# Patient Record
Sex: FEMALE | Race: WHITE | NOT HISPANIC OR LATINO
[De-identification: names, ages, dates, MRNs, and addresses within clinical notes are randomized per-mention and may not be internally consistent; named-entity substitution may affect disease eponyms.]

---

## 2017-04-10 ENCOUNTER — MEDICATION RENEWAL (OUTPATIENT)
Age: 67
End: 2017-04-10

## 2017-04-19 ENCOUNTER — MEDICATION RENEWAL (OUTPATIENT)
Age: 67
End: 2017-04-19

## 2017-04-27 ENCOUNTER — RX RENEWAL (OUTPATIENT)
Age: 67
End: 2017-04-27

## 2017-05-03 ENCOUNTER — NON-APPOINTMENT (OUTPATIENT)
Age: 67
End: 2017-05-03

## 2017-05-03 ENCOUNTER — APPOINTMENT (OUTPATIENT)
Dept: INTERNAL MEDICINE | Facility: CLINIC | Age: 67
End: 2017-05-03

## 2017-05-03 VITALS
TEMPERATURE: 98.4 F | OXYGEN SATURATION: 97 % | BODY MASS INDEX: 28.32 KG/M2 | HEIGHT: 65 IN | SYSTOLIC BLOOD PRESSURE: 126 MMHG | HEART RATE: 85 BPM | DIASTOLIC BLOOD PRESSURE: 72 MMHG | RESPIRATION RATE: 16 BRPM | WEIGHT: 170 LBS

## 2017-05-17 ENCOUNTER — APPOINTMENT (OUTPATIENT)
Dept: INTERNAL MEDICINE | Facility: CLINIC | Age: 67
End: 2017-05-17

## 2017-05-17 ENCOUNTER — OTHER (OUTPATIENT)
Age: 67
End: 2017-05-17

## 2017-05-17 VITALS
HEART RATE: 74 BPM | BODY MASS INDEX: 28.82 KG/M2 | TEMPERATURE: 98.7 F | OXYGEN SATURATION: 95 % | HEIGHT: 65 IN | WEIGHT: 173 LBS | DIASTOLIC BLOOD PRESSURE: 70 MMHG | SYSTOLIC BLOOD PRESSURE: 124 MMHG | RESPIRATION RATE: 16 BRPM

## 2017-05-19 ENCOUNTER — MEDICATION RENEWAL (OUTPATIENT)
Age: 67
End: 2017-05-19

## 2017-06-12 ENCOUNTER — RECORD ABSTRACTING (OUTPATIENT)
Age: 67
End: 2017-06-12

## 2017-06-12 DIAGNOSIS — D21.6 BENIGN NEOPLASM OF CONNECTIVE AND OTHER SOFT TISSUE OF TRUNK, UNSPECIFIED: ICD-10-CM

## 2017-06-12 DIAGNOSIS — R94.31 ABNORMAL ELECTROCARDIOGRAM [ECG] [EKG]: ICD-10-CM

## 2017-06-12 DIAGNOSIS — Z79.899 OTHER LONG TERM (CURRENT) DRUG THERAPY: ICD-10-CM

## 2017-06-13 ENCOUNTER — APPOINTMENT (OUTPATIENT)
Dept: INTERNAL MEDICINE | Facility: CLINIC | Age: 67
End: 2017-06-13

## 2017-06-13 ENCOUNTER — MED ADMIN CHARGE (OUTPATIENT)
Age: 67
End: 2017-06-13

## 2017-06-13 ENCOUNTER — NON-APPOINTMENT (OUTPATIENT)
Age: 67
End: 2017-06-13

## 2017-06-13 VITALS
HEIGHT: 65 IN | DIASTOLIC BLOOD PRESSURE: 72 MMHG | HEART RATE: 80 BPM | RESPIRATION RATE: 18 BRPM | WEIGHT: 175 LBS | OXYGEN SATURATION: 97 % | BODY MASS INDEX: 29.16 KG/M2 | SYSTOLIC BLOOD PRESSURE: 116 MMHG | TEMPERATURE: 98.5 F

## 2017-06-13 DIAGNOSIS — Z82.49 FAMILY HISTORY OF ISCHEMIC HEART DISEASE AND OTHER DISEASES OF THE CIRCULATORY SYSTEM: ICD-10-CM

## 2017-06-13 DIAGNOSIS — J98.01 ACUTE BRONCHOSPASM: ICD-10-CM

## 2017-06-13 DIAGNOSIS — Z80.2 FAMILY HISTORY OF MALIGNANT NEOPLASM OF OTHER RESPIRATORY AND INTRATHORACIC ORGANS: ICD-10-CM

## 2017-06-13 DIAGNOSIS — Z81.8 FAMILY HISTORY OF OTHER MENTAL AND BEHAVIORAL DISORDERS: ICD-10-CM

## 2017-06-13 DIAGNOSIS — Z80.3 FAMILY HISTORY OF MALIGNANT NEOPLASM OF BREAST: ICD-10-CM

## 2017-06-13 DIAGNOSIS — Z91.19 PATIENT'S NONCOMPLIANCE WITH OTHER MEDICAL TREATMENT AND REGIMEN: ICD-10-CM

## 2017-06-13 DIAGNOSIS — Z78.9 OTHER SPECIFIED HEALTH STATUS: ICD-10-CM

## 2017-06-13 LAB
BASOPHILS # BLD AUTO: 0.02 K/UL
BASOPHILS NFR BLD AUTO: 0.3 %
EOSINOPHIL # BLD AUTO: 0.2 K/UL
EOSINOPHIL NFR BLD AUTO: 3.3 %
HCT VFR BLD CALC: 41.6 %
HGB BLD-MCNC: 13.7 G/DL
IMM GRANULOCYTES NFR BLD AUTO: 0.2 %
LYMPHOCYTES # BLD AUTO: 2.37 K/UL
LYMPHOCYTES NFR BLD AUTO: 38.7 %
MAN DIFF?: NORMAL
MCHC RBC-ENTMCNC: 30.4 PG
MCHC RBC-ENTMCNC: 32.9 GM/DL
MCV RBC AUTO: 92.2 FL
MONOCYTES # BLD AUTO: 0.45 K/UL
MONOCYTES NFR BLD AUTO: 7.4 %
NEUTROPHILS # BLD AUTO: 3.07 K/UL
NEUTROPHILS NFR BLD AUTO: 50.1 %
PLATELET # BLD AUTO: 244 K/UL
RBC # BLD: 4.51 M/UL
RBC # FLD: 13.4 %
WBC # FLD AUTO: 6.12 K/UL

## 2017-06-13 RX ORDER — TRIAMCINOLONE ACETONIDE 40 MG/ML
40 INJECTION, SUSPENSION INTRA-ARTICULAR; INTRAMUSCULAR
Qty: 1 | Refills: 0 | Status: COMPLETED | OUTPATIENT
Start: 2017-06-13

## 2017-06-13 RX ADMIN — TRIAMCINOLONE ACETONIDE 1.5 MG/ML: 40 INJECTION, SUSPENSION INTRA-ARTICULAR; INTRAMUSCULAR at 00:00

## 2017-11-03 ENCOUNTER — OTHER (OUTPATIENT)
Age: 67
End: 2017-11-03

## 2017-11-06 ENCOUNTER — APPOINTMENT (OUTPATIENT)
Dept: INTERNAL MEDICINE | Facility: CLINIC | Age: 67
End: 2017-11-06
Payer: MEDICARE

## 2017-11-06 ENCOUNTER — APPOINTMENT (OUTPATIENT)
Dept: INTERNAL MEDICINE | Facility: CLINIC | Age: 67
End: 2017-11-06
Payer: COMMERCIAL

## 2017-11-06 VITALS
WEIGHT: 175 LBS | TEMPERATURE: 98.4 F | HEIGHT: 65 IN | RESPIRATION RATE: 20 BRPM | HEART RATE: 80 BPM | DIASTOLIC BLOOD PRESSURE: 68 MMHG | BODY MASS INDEX: 29.16 KG/M2 | OXYGEN SATURATION: 97 % | SYSTOLIC BLOOD PRESSURE: 144 MMHG

## 2017-11-06 PROCEDURE — 90670 PCV13 VACCINE IM: CPT

## 2017-11-06 PROCEDURE — 94060 EVALUATION OF WHEEZING: CPT

## 2017-11-06 PROCEDURE — ZZZZZ: CPT

## 2017-11-06 PROCEDURE — 94729 DIFFUSING CAPACITY: CPT

## 2017-11-06 PROCEDURE — G0009: CPT

## 2017-11-06 PROCEDURE — 94727 GAS DIL/WSHOT DETER LNG VOL: CPT

## 2017-11-06 PROCEDURE — 99214 OFFICE O/P EST MOD 30 MIN: CPT | Mod: 25

## 2017-12-05 LAB
CHOLEST SERPL-MCNC: 204
GLUCOSE SERPL-MCNC: 102
HBA1C MFR BLD HPLC: 5.4
HDLC SERPL-MCNC: 43
LDLC SERPL CALC-MCNC: 122

## 2018-04-09 ENCOUNTER — RX RENEWAL (OUTPATIENT)
Age: 68
End: 2018-04-09

## 2018-04-20 ENCOUNTER — RX RENEWAL (OUTPATIENT)
Age: 68
End: 2018-04-20

## 2018-05-08 ENCOUNTER — APPOINTMENT (OUTPATIENT)
Dept: INTERNAL MEDICINE | Facility: CLINIC | Age: 68
End: 2018-05-08
Payer: MEDICARE

## 2018-05-08 ENCOUNTER — NON-APPOINTMENT (OUTPATIENT)
Age: 68
End: 2018-05-08

## 2018-05-08 VITALS
HEIGHT: 65 IN | TEMPERATURE: 97.6 F | DIASTOLIC BLOOD PRESSURE: 88 MMHG | SYSTOLIC BLOOD PRESSURE: 142 MMHG | WEIGHT: 185 LBS | BODY MASS INDEX: 30.82 KG/M2 | RESPIRATION RATE: 18 BRPM | OXYGEN SATURATION: 96 % | HEART RATE: 88 BPM

## 2018-05-08 DIAGNOSIS — E83.52 HYPERCALCEMIA: ICD-10-CM

## 2018-05-08 PROCEDURE — 99214 OFFICE O/P EST MOD 30 MIN: CPT | Mod: 25

## 2018-05-08 PROCEDURE — 94010 BREATHING CAPACITY TEST: CPT

## 2018-06-07 ENCOUNTER — APPOINTMENT (OUTPATIENT)
Dept: INTERNAL MEDICINE | Facility: CLINIC | Age: 68
End: 2018-06-07
Payer: MEDICARE

## 2018-06-07 VITALS
HEART RATE: 80 BPM | RESPIRATION RATE: 16 BRPM | SYSTOLIC BLOOD PRESSURE: 126 MMHG | BODY MASS INDEX: 30.82 KG/M2 | DIASTOLIC BLOOD PRESSURE: 80 MMHG | HEIGHT: 65 IN | OXYGEN SATURATION: 96 % | WEIGHT: 185 LBS | TEMPERATURE: 98.7 F

## 2018-06-07 DIAGNOSIS — Z87.09 PERSONAL HISTORY OF OTHER DISEASES OF THE RESPIRATORY SYSTEM: ICD-10-CM

## 2018-06-07 PROCEDURE — 99213 OFFICE O/P EST LOW 20 MIN: CPT

## 2018-06-07 NOTE — PLAN
[FreeTextEntry1] : Warm salt gargles.\par Increase fluids.\par Cefuroxime 500 mg tabs 1 tab every 12 hrs for 10 days.\par Medrol dose pack for throat pain relief.\par Instructed to call office for worsening symptoms, no resolve.

## 2018-06-07 NOTE — REVIEW OF SYSTEMS
[Fever] : no fever [Chills] : no chills [Fatigue] : fatigue [Hoarseness] : hoarseness [Nasal Discharge] : no nasal discharge [Sore Throat] : sore throat [Postnasal Drip] : no postnasal drip [Negative] : Psychiatric

## 2018-06-07 NOTE — HISTORY OF PRESENT ILLNESS
[FreeTextEntry8] : Patient presents for evaluation of sore throat.  \par Onset 48 hours ago, so painful that she had difficulty sleeping last night.  \par Denies other respiratory ailments at this time.  \par No other contacts are ill.  \par She has not tried anything for relief.\par Asthma has been under control.  Compliant with pulmonary medications.\par Mentions that she has been using Albuterol before her walks in the park because of allergy season and this has been working well for her.

## 2018-06-07 NOTE — PHYSICAL EXAM
[No Acute Distress] : no acute distress [Well Nourished] : well nourished [Well Developed] : well developed [Normal Sclera/Conjunctiva] : normal sclera/conjunctiva [PERRL] : pupils equal round and reactive to light [Normal TMs] : both tympanic membranes were normal [Supple] : supple [No Respiratory Distress] : no respiratory distress  [Clear to Auscultation] : lungs were clear to auscultation bilaterally [Normal Rate] : normal rate  [Regular Rhythm] : with a regular rhythm [Normal S1, S2] : normal S1 and S2 [No Edema] : there was no peripheral edema [Normal Posterior Cervical Nodes] : no posterior cervical lymphadenopathy [Normal Anterior Cervical Nodes] : no anterior cervical lymphadenopathy [Grossly Normal Strength/Tone] : grossly normal strength/tone [No Rash] : no rash [No Focal Deficits] : no focal deficits [Normal Affect] : the affect was normal [Normal Insight/Judgement] : insight and judgment were intact [de-identified] : Oropharynx moderately erythematous without exudates.

## 2018-07-18 PROBLEM — E83.52 SERUM CALCIUM ELEVATED: Status: ACTIVE | Noted: 2018-07-18

## 2018-07-19 ENCOUNTER — RX CHANGE (OUTPATIENT)
Age: 68
End: 2018-07-19

## 2018-07-19 RX ORDER — ROSUVASTATIN CALCIUM 10 MG/1
10 TABLET, FILM COATED ORAL DAILY
Qty: 30 | Refills: 0 | Status: DISCONTINUED | COMMUNITY
Start: 2017-04-27 | End: 2018-07-19

## 2018-07-20 ENCOUNTER — MEDICATION RENEWAL (OUTPATIENT)
Age: 68
End: 2018-07-20

## 2018-09-04 ENCOUNTER — RX RENEWAL (OUTPATIENT)
Age: 68
End: 2018-09-04

## 2018-09-24 ENCOUNTER — OUTPATIENT (OUTPATIENT)
Dept: OUTPATIENT SERVICES | Facility: HOSPITAL | Age: 68
LOS: 1 days | Discharge: HOME | End: 2018-09-24
Payer: MEDICARE

## 2018-09-24 VITALS
HEIGHT: 65 IN | RESPIRATION RATE: 16 BRPM | SYSTOLIC BLOOD PRESSURE: 152 MMHG | OXYGEN SATURATION: 98 % | WEIGHT: 168.43 LBS | HEART RATE: 84 BPM | DIASTOLIC BLOOD PRESSURE: 84 MMHG | TEMPERATURE: 99 F

## 2018-09-24 DIAGNOSIS — N39.3 STRESS INCONTINENCE (FEMALE) (MALE): ICD-10-CM

## 2018-09-24 DIAGNOSIS — Z98.890 OTHER SPECIFIED POSTPROCEDURAL STATES: Chronic | ICD-10-CM

## 2018-09-24 DIAGNOSIS — Z87.19 PERSONAL HISTORY OF OTHER DISEASES OF THE DIGESTIVE SYSTEM: Chronic | ICD-10-CM

## 2018-09-24 LAB
ALLERGY+IMMUNOLOGY DIAG STUDY NOTE: SIGNIFICANT CHANGE UP
ANION GAP SERPL CALC-SCNC: 3 MMOL/L — LOW (ref 5–17)
APTT BLD: 40.4 SEC — HIGH (ref 27.5–37.4)
BASOPHILS # BLD AUTO: 0.04 K/UL — SIGNIFICANT CHANGE UP (ref 0–0.2)
BASOPHILS NFR BLD AUTO: 0.7 % — SIGNIFICANT CHANGE UP (ref 0–2)
BUN SERPL-MCNC: 23 MG/DL — SIGNIFICANT CHANGE UP (ref 7–23)
CALCIUM SERPL-MCNC: 9.3 MG/DL — SIGNIFICANT CHANGE UP (ref 8.5–10.1)
CHLORIDE SERPL-SCNC: 106 MMOL/L — SIGNIFICANT CHANGE UP (ref 96–108)
CO2 SERPL-SCNC: 28 MMOL/L — SIGNIFICANT CHANGE UP (ref 22–31)
CREAT SERPL-MCNC: 1.15 MG/DL — SIGNIFICANT CHANGE UP (ref 0.5–1.3)
EOSINOPHIL # BLD AUTO: 0.18 K/UL — SIGNIFICANT CHANGE UP (ref 0–0.5)
EOSINOPHIL NFR BLD AUTO: 3 % — SIGNIFICANT CHANGE UP (ref 0–6)
GLUCOSE SERPL-MCNC: 96 MG/DL — SIGNIFICANT CHANGE UP (ref 70–99)
HCT VFR BLD CALC: 45.2 % — HIGH (ref 34.5–45)
HGB BLD-MCNC: 15 G/DL — SIGNIFICANT CHANGE UP (ref 11.5–15.5)
IMM GRANULOCYTES NFR BLD AUTO: 0.3 % — SIGNIFICANT CHANGE UP (ref 0–1.5)
INR BLD: 1 RATIO — SIGNIFICANT CHANGE UP (ref 0.88–1.16)
LYMPHOCYTES # BLD AUTO: 1.83 K/UL — SIGNIFICANT CHANGE UP (ref 1–3.3)
LYMPHOCYTES # BLD AUTO: 30.3 % — SIGNIFICANT CHANGE UP (ref 13–44)
MCHC RBC-ENTMCNC: 31 PG — SIGNIFICANT CHANGE UP (ref 27–34)
MCHC RBC-ENTMCNC: 33.2 GM/DL — SIGNIFICANT CHANGE UP (ref 32–36)
MCV RBC AUTO: 93.4 FL — SIGNIFICANT CHANGE UP (ref 80–100)
MONOCYTES # BLD AUTO: 0.48 K/UL — SIGNIFICANT CHANGE UP (ref 0–0.9)
MONOCYTES NFR BLD AUTO: 8 % — SIGNIFICANT CHANGE UP (ref 2–14)
NEUTROPHILS # BLD AUTO: 3.48 K/UL — SIGNIFICANT CHANGE UP (ref 1.8–7.4)
NEUTROPHILS NFR BLD AUTO: 57.7 % — SIGNIFICANT CHANGE UP (ref 43–77)
NRBC # BLD: 0 /100 WBCS — SIGNIFICANT CHANGE UP (ref 0–0)
PLATELET # BLD AUTO: 258 K/UL — SIGNIFICANT CHANGE UP (ref 150–400)
POTASSIUM SERPL-MCNC: 4.6 MMOL/L — SIGNIFICANT CHANGE UP (ref 3.5–5.3)
POTASSIUM SERPL-SCNC: 4.6 MMOL/L — SIGNIFICANT CHANGE UP (ref 3.5–5.3)
PROTHROM AB SERPL-ACNC: 10.8 SEC — SIGNIFICANT CHANGE UP (ref 9.8–12.7)
RBC # BLD: 4.84 M/UL — SIGNIFICANT CHANGE UP (ref 3.8–5.2)
RBC # FLD: 12.3 % — SIGNIFICANT CHANGE UP (ref 10.3–14.5)
SODIUM SERPL-SCNC: 137 MMOL/L — SIGNIFICANT CHANGE UP (ref 135–145)
WBC # BLD: 6.03 K/UL — SIGNIFICANT CHANGE UP (ref 3.8–10.5)
WBC # FLD AUTO: 6.03 K/UL — SIGNIFICANT CHANGE UP (ref 3.8–10.5)

## 2018-09-24 PROCEDURE — 93010 ELECTROCARDIOGRAM REPORT: CPT

## 2018-09-24 NOTE — H&P PST ADULT - VISION (WITH CORRECTIVE LENSES IF THE PATIENT USUALLY WEARS THEM):
Wears corrective lenses for reading/Partially impaired: cannot see medication labels or newsprint, but can see obstacles in path, and the surrounding layout; can count fingers at arm's length

## 2018-09-24 NOTE — H&P PST ADULT - PSH
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

## 2018-09-24 NOTE — H&P PST ADULT - ASSESSMENT
66 y/o female scheduled for urethral sling on 10/04/2018 with Dr Miranda      PLAN:  1. Labs: CBC, BMP, T&S, PT/PTT/INR  2. EKG  3. Medical clearance evaluation with Dr Bennett on 09/27/2018  4. Discussed EZ sponges, mupirocin,  and day of procedure instructions. Pt verbalized complete understanding of instructions.

## 2018-09-24 NOTE — H&P PST ADULT - HISTORY OF PRESENT ILLNESS
68 y/o female with pmhx of HLD, asthma, hernia presents to PST prior to urethral sling Denies hematuria, urgency, or frequency. Now for proposed procedure.

## 2018-09-24 NOTE — H&P PST ADULT - NSANTHOSAYNRD_GEN_A_CORE
No. USHA screening performed.  STOP BANG Legend: 0-2 = LOW Risk; 3-4 = INTERMEDIATE Risk; 5-8 = HIGH Risk

## 2018-09-24 NOTE — H&P PST ADULT - REASON FOR ADMISSION
"I'm having a sleeve placed in my vaginal for my bladder" "I'm having a sleeve placed in my vagina for my bladder"

## 2018-09-25 ENCOUNTER — RESULT CHARGE (OUTPATIENT)
Age: 68
End: 2018-09-25

## 2018-09-27 ENCOUNTER — APPOINTMENT (OUTPATIENT)
Dept: INTERNAL MEDICINE | Facility: CLINIC | Age: 68
End: 2018-09-27
Payer: MEDICARE

## 2018-09-27 ENCOUNTER — NON-APPOINTMENT (OUTPATIENT)
Age: 68
End: 2018-09-27

## 2018-09-27 ENCOUNTER — LABORATORY RESULT (OUTPATIENT)
Age: 68
End: 2018-09-27

## 2018-09-27 VITALS
BODY MASS INDEX: 27.99 KG/M2 | HEIGHT: 65 IN | DIASTOLIC BLOOD PRESSURE: 86 MMHG | WEIGHT: 168 LBS | TEMPERATURE: 98.7 F | RESPIRATION RATE: 18 BRPM | HEART RATE: 96 BPM | OXYGEN SATURATION: 96 % | SYSTOLIC BLOOD PRESSURE: 123 MMHG

## 2018-09-27 DIAGNOSIS — R79.1 ABNORMAL COAGULATION PROFILE: ICD-10-CM

## 2018-09-27 DIAGNOSIS — Z87.09 PERSONAL HISTORY OF OTHER DISEASES OF THE RESPIRATORY SYSTEM: ICD-10-CM

## 2018-09-27 DIAGNOSIS — Z87.440 PERSONAL HISTORY OF URINARY (TRACT) INFECTIONS: ICD-10-CM

## 2018-09-27 DIAGNOSIS — J44.9 CHRONIC OBSTRUCTIVE PULMONARY DISEASE, UNSPECIFIED: ICD-10-CM

## 2018-09-27 DIAGNOSIS — R68.89 OTHER GENERAL SYMPTOMS AND SIGNS: ICD-10-CM

## 2018-09-27 DIAGNOSIS — R32 UNSPECIFIED URINARY INCONTINENCE: ICD-10-CM

## 2018-09-27 DIAGNOSIS — Z86.69 PERSONAL HISTORY OF OTHER DISEASES OF THE NERVOUS SYSTEM AND SENSE ORGANS: ICD-10-CM

## 2018-09-27 LAB
APTT BLD: 34.5 SEC
INR PPP: 0.96 RATIO
PT BLD: 10.8 SEC

## 2018-09-27 PROCEDURE — 99214 OFFICE O/P EST MOD 30 MIN: CPT | Mod: 25

## 2018-09-27 PROCEDURE — 94060 EVALUATION OF WHEEZING: CPT

## 2018-09-27 RX ORDER — METHYLPREDNISOLONE 4 MG/1
4 TABLET ORAL
Qty: 1 | Refills: 0 | Status: COMPLETED | COMMUNITY
Start: 2018-06-07 | End: 2018-09-27

## 2018-09-27 RX ORDER — CEFUROXIME AXETIL 500 MG/1
500 TABLET ORAL
Qty: 20 | Refills: 0 | Status: COMPLETED | COMMUNITY
Start: 2018-06-07 | End: 2018-09-27

## 2018-09-27 RX ORDER — DOXYCYCLINE HYCLATE 100 MG/1
100 CAPSULE ORAL
Qty: 20 | Refills: 0 | Status: COMPLETED | COMMUNITY
Start: 2017-05-17 | End: 2018-09-27

## 2018-09-27 NOTE — REVIEW OF SYSTEMS
[Cough] : cough [SOB on Exertion] : shortness of breath during exertion [Arthralgias] : arthralgias [Anxiety] : anxiety [Negative] : Heme/Lymph [Wheezing] : wheezing [Incontinence] : incontinence [Abn Vaginal Bleeding] : unexplained vaginal bleeding [Joint Stiffness] : joint stiffness [Fever] : no fever [Chills] : no chills [Feeling Poorly] : not feeling poorly [Feeling Tired] : not feeling tired [Recent Weight Gain (___ Lbs)] : no recent weight gain [Eye Pain] : no eye pain [Red Eyes] : eyes not red [Discharge From Eyes] : no purulent discharge from the eyes [Nosebleeds] : no nosebleeds [Nasal Discharge] : no nasal discharge [Sore Throat] : no sore throat [Chest Pain] : no chest pain [Palpitations] : no palpitations [Leg Claudication] : no intermittent leg claudication [Lower Ext Edema] : no lower extremity edema [Orthopnea] : no orthopnea [PND] : no PND [Abdominal Pain] : no abdominal pain [Heartburn] : no heartburn [Melena] : no melena [Dysuria] : no dysuria [Pelvic Pain] : no pelvic pain [Joint Pain] : no joint pain [Joint Swelling] : no joint swelling [Skin Lesions] : no skin lesions [Itching] : no itching [Dizziness] : no dizziness [Fainting] : no fainting [Difficulty Walking] : no difficulty walking [Suicidal] : not suicidal [Depression] : no depression [Hot Flashes] : no hot flashes [Muscle Weakness] : no muscle weakness [Feelings Of Weakness] : no feelings of weakness [Easy Bleeding] : no tendency for easy bleeding [Easy Bruising] : no tendency for easy bruising

## 2018-09-27 NOTE — COUNSELING
[Weight management counseling provided] : Weight management [Healthy eating counseling provided] : healthy eating [Activity counseling provided] : activity [Walking] : Walking [Patient Non-adherent to care plan] : Patient non-adherent to care plan [Good understanding] : Patient has a good understanding of lifestyle changes and the steps needed to achieve self management goals

## 2018-09-27 NOTE — PLAN
[FreeTextEntry1] : To [facility] now for PT,  APTT and APTT 50:50 mix.\par Prednisone 20mg BID 6 days starting today.\par Start Symbicort 160mcg as 2 puffs BID and stay on.\par She will rinse after used.\par Continue JAMARCUS PRN.\par Take Symbicort and neb Albuterol in AM of surgery  with Omeprazole and sip of water.\par Low fat/chol/Na diet with proper hydration.\par Exercise when released to do so by Dr Miranda.\par She will return for influenza vaccine.\par Call with any decomp.\par F/U as scheduled and PRN.\par

## 2018-09-27 NOTE — ASSESSMENT
[Patient Optimized for Surgery] : Patient optimized for surgery [Other: _____] : [unfilled] [Modify medications prior to procedure] : Modify medications prior to procedure [As per surgery] : as per surgery [FreeTextEntry4] : There is no absolute contraindication to procedure pending repeat labs today.\par Holding NSAIDs, ASA, vitamins and fish oil 7 days before surgery.\par DVT prophylaxis, close airway monitoring and O2 sat monitoring is required.\par \par \par

## 2018-09-27 NOTE — PHYSICAL EXAM
[General Appearance - Alert] : alert [General Appearance - In No Acute Distress] : in no acute distress [General Appearance - Well Developed] : well developed [Sclera] : the sclera and conjunctiva were normal [PERRL With Normal Accommodation] : pupils were equal in size, round, and reactive to light [Strabismus] : no strabismus was seen [Outer Ear] : the ears and nose were normal in appearance [Hearing Threshold Finger Rub Not Tama] : hearing was normal [Examination Of The Oral Cavity] : the lips and gums were normal [Both Tympanic Membranes Were Examined] : both tympanic membranes were normal [Oropharynx] : the oropharynx was normal [Neck Appearance] : the appearance of the neck was normal [Neck Cervical Mass (___cm)] : no neck mass was observed [Jugular Venous Distention Increased] : there was no jugular-venous distention [Respiration, Rhythm And Depth] : normal respiratory rhythm and effort [Exaggerated Use Of Accessory Muscles For Inspiration] : no accessory muscle use [Heart Rate And Rhythm] : heart rate was normal and rhythm regular [Heart Sounds] : normal S1 and S2 [Heart Sounds Gallop] : no gallops [Heart Sounds Pericardial Friction Rub] : no pericardial rub [Edema] : there was no peripheral edema [Veins - Varicosity Changes] : there were no varicosital changes [Abdomen Soft] : soft [Cervical Lymph Nodes Enlarged Anterior Bilaterally] : anterior cervical [Supraclavicular Lymph Nodes Enlarged Bilaterally] : supraclavicular [Nail Clubbing] : no clubbing  or cyanosis of the fingernails [No Focal Deficits] : no focal deficits [Oriented To Time, Place, And Person] : oriented to person, place, and time [Impaired Insight] : insight and judgment were intact [Affect] : the affect was normal [Mood] : the mood was normal [General Appearance - Well-Appearing] : healthy appearing [] : the neck was supple [Thyroid Diffuse Enlargement] : the thyroid was not enlarged [Arterial Pulses Carotid] : carotid pulses were normal with no bruits [Bowel Sounds] : normal bowel sounds [Abdomen Tenderness] : non-tender [Abnormal Walk] : normal gait [Musculoskeletal - Swelling] : no joint swelling seen [Skin Color & Pigmentation] : normal skin color and pigmentation [Skin Turgor] : normal skin turgor [Memory Recent] : recent memory was not impaired [FreeTextEntry1] : cheerful.

## 2018-09-27 NOTE — RESULTS/DATA
[] : results reviewed [de-identified] : unremarkable with sl elevation HCT. [de-identified] : elevate APTT [de-identified] : unremarkable [de-identified] : NSR 75 bpm, normal axis and intervals, septal Q otherwise good R wave progression V1-6 with T inv V3-6. No acute ST-T abn and no change c/w 3-28-12 EKG. [de-identified] : A pre-and post spirogram was performed today. This reveals moderate restriction with moderate to severe obstruction and moderate airway reactivity.\par

## 2018-09-27 NOTE — HISTORY OF PRESENT ILLNESS
[No Pertinent Cardiac History] : no history of aortic stenosis, atrial fibrillation, coronary artery disease, recent myocardial infarction, or implantable device/pacemaker [Asthma] : asthma [COPD] : COPD [Smoker] : smoker [No Adverse Anesthesia Reaction] : no adverse anesthesia reaction in self or family member [Aortic Stenosis] : no aortic stenosis [Atrial Fibrillation] : no atrial fibrillation [Implantable Device/Pacemaker] : no implantable device/pacemaker [Sleep Apnea] : no sleep apnea [Chronic Anticoagulation] : no chronic anticoagulation [Chronic Kidney Disease] : no chronic kidney disease [Diabetes] : no diabetes [FreeTextEntry1] : Pelvic reconstruction with bladder suspension [FreeTextEntry2] : 10-4-18 [FreeTextEntry3] : Dr Miranda at  [facility]. [FreeTextEntry4] : "I am having bladder surgery."\par \par The patient reports a long history of urinary stress incontinence and "dropped bladder. UTI are treated at least 2x a year and Dr Miranda has recommended surgical intervention. \par \par The patient admits to slightly worsening of asthma over the past few weeks with cough, wheeze, nasal congestion and post nasal drip. She denies discolored sputum, fever, chills, chest pain or hemoptysis. ProAir is not taken frequently. She states she is compliant with all medications but takes PPI QOD w/o increase in GERD sxs. She has not taken ICS or control medication in many years. Her weight is stable and she exercise 2-3x a week with mild wheeze during exercise. [FreeTextEntry5] : ex-smoker [FreeTextEntry6] : See HPI

## 2018-09-30 RX ORDER — MUPIROCIN 20 MG/G
1 OINTMENT TOPICAL
Qty: 0 | Refills: 0 | COMMUNITY
Start: 2018-09-30 | End: 2018-10-04

## 2018-10-03 RX ORDER — SODIUM CHLORIDE 9 MG/ML
3 INJECTION INTRAMUSCULAR; INTRAVENOUS; SUBCUTANEOUS EVERY 8 HOURS
Qty: 0 | Refills: 0 | Status: DISCONTINUED | OUTPATIENT
Start: 2018-10-04 | End: 2018-10-05

## 2018-10-04 ENCOUNTER — OUTPATIENT (OUTPATIENT)
Dept: OUTPATIENT SERVICES | Facility: HOSPITAL | Age: 68
LOS: 1 days | Discharge: HOME | End: 2018-10-04
Payer: MEDICARE

## 2018-10-04 ENCOUNTER — RESULT REVIEW (OUTPATIENT)
Age: 68
End: 2018-10-04

## 2018-10-04 VITALS
OXYGEN SATURATION: 98 % | HEART RATE: 69 BPM | WEIGHT: 168.43 LBS | TEMPERATURE: 98 F | SYSTOLIC BLOOD PRESSURE: 151 MMHG | DIASTOLIC BLOOD PRESSURE: 83 MMHG | RESPIRATION RATE: 16 BRPM

## 2018-10-04 DIAGNOSIS — Z86.018 PERSONAL HISTORY OF OTHER BENIGN NEOPLASM: Chronic | ICD-10-CM

## 2018-10-04 DIAGNOSIS — Z87.19 PERSONAL HISTORY OF OTHER DISEASES OF THE DIGESTIVE SYSTEM: Chronic | ICD-10-CM

## 2018-10-04 DIAGNOSIS — Z98.890 OTHER SPECIFIED POSTPROCEDURAL STATES: Chronic | ICD-10-CM

## 2018-10-04 LAB
HCT VFR BLD CALC: 36.3 % — SIGNIFICANT CHANGE UP (ref 34.5–45)
HGB BLD-MCNC: 12.4 G/DL — SIGNIFICANT CHANGE UP (ref 11.5–15.5)

## 2018-10-04 PROCEDURE — 88302 TISSUE EXAM BY PATHOLOGIST: CPT | Mod: 26

## 2018-10-04 RX ORDER — BUDESONIDE AND FORMOTEROL FUMARATE DIHYDRATE 160; 4.5 UG/1; UG/1
2 AEROSOL RESPIRATORY (INHALATION)
Qty: 0 | Refills: 0 | COMMUNITY

## 2018-10-04 RX ORDER — SODIUM CHLORIDE 9 MG/ML
1000 INJECTION, SOLUTION INTRAVENOUS
Qty: 0 | Refills: 0 | Status: DISCONTINUED | OUTPATIENT
Start: 2018-10-04 | End: 2018-10-05

## 2018-10-04 RX ORDER — ACETAMINOPHEN 500 MG
1000 TABLET ORAL ONCE
Qty: 0 | Refills: 0 | Status: DISCONTINUED | OUTPATIENT
Start: 2018-10-04 | End: 2018-10-04

## 2018-10-04 RX ORDER — ROSUVASTATIN CALCIUM 5 MG/1
30 TABLET ORAL
Qty: 0 | Refills: 0 | COMMUNITY

## 2018-10-04 RX ORDER — FENOFIBRATE,MICRONIZED 130 MG
1 CAPSULE ORAL
Qty: 0 | Refills: 0 | COMMUNITY

## 2018-10-04 RX ORDER — IBUPROFEN 200 MG
600 TABLET ORAL EVERY 6 HOURS
Qty: 0 | Refills: 0 | Status: DISCONTINUED | OUTPATIENT
Start: 2018-10-04 | End: 2018-10-05

## 2018-10-04 RX ORDER — OMEPRAZOLE 10 MG/1
1 CAPSULE, DELAYED RELEASE ORAL
Qty: 0 | Refills: 0 | COMMUNITY

## 2018-10-04 RX ORDER — FENTANYL CITRATE 50 UG/ML
50 INJECTION INTRAVENOUS
Qty: 0 | Refills: 0 | Status: DISCONTINUED | OUTPATIENT
Start: 2018-10-04 | End: 2018-10-04

## 2018-10-04 RX ORDER — ALBUTEROL 90 UG/1
2 AEROSOL, METERED ORAL
Qty: 0 | Refills: 0 | COMMUNITY

## 2018-10-04 RX ORDER — RANITIDINE HYDROCHLORIDE 150 MG/1
1 TABLET, FILM COATED ORAL
Qty: 0 | Refills: 0 | COMMUNITY

## 2018-10-04 RX ORDER — ACETAMINOPHEN 500 MG
975 TABLET ORAL ONCE
Qty: 0 | Refills: 0 | Status: COMPLETED | OUTPATIENT
Start: 2018-10-04 | End: 2018-10-04

## 2018-10-04 RX ORDER — SODIUM CHLORIDE 9 MG/ML
1000 INJECTION, SOLUTION INTRAVENOUS
Qty: 0 | Refills: 0 | Status: DISCONTINUED | OUTPATIENT
Start: 2018-10-04 | End: 2018-10-04

## 2018-10-04 RX ORDER — ONDANSETRON 8 MG/1
4 TABLET, FILM COATED ORAL ONCE
Qty: 0 | Refills: 0 | Status: DISCONTINUED | OUTPATIENT
Start: 2018-10-04 | End: 2018-10-04

## 2018-10-04 RX ORDER — HYDROMORPHONE HYDROCHLORIDE 2 MG/ML
2 INJECTION INTRAMUSCULAR; INTRAVENOUS; SUBCUTANEOUS
Qty: 0 | Refills: 0 | Status: DISCONTINUED | OUTPATIENT
Start: 2018-10-04 | End: 2018-10-05

## 2018-10-04 RX ORDER — OXYCODONE AND ACETAMINOPHEN 5; 325 MG/1; MG/1
1 TABLET ORAL
Qty: 0 | Refills: 0 | Status: DISCONTINUED | OUTPATIENT
Start: 2018-10-04 | End: 2018-10-05

## 2018-10-04 RX ORDER — ONDANSETRON 8 MG/1
4 TABLET, FILM COATED ORAL EVERY 6 HOURS
Qty: 0 | Refills: 0 | Status: DISCONTINUED | OUTPATIENT
Start: 2018-10-04 | End: 2018-10-05

## 2018-10-04 RX ORDER — OXYCODONE AND ACETAMINOPHEN 5; 325 MG/1; MG/1
2 TABLET ORAL
Qty: 0 | Refills: 0 | Status: DISCONTINUED | OUTPATIENT
Start: 2018-10-04 | End: 2018-10-05

## 2018-10-04 RX ADMIN — Medication 975 MILLIGRAM(S): at 11:05

## 2018-10-04 RX ADMIN — Medication 600 MILLIGRAM(S): at 23:42

## 2018-10-04 RX ADMIN — FENTANYL CITRATE 50 MICROGRAM(S): 50 INJECTION INTRAVENOUS at 15:30

## 2018-10-04 RX ADMIN — SODIUM CHLORIDE 3 MILLILITER(S): 9 INJECTION INTRAMUSCULAR; INTRAVENOUS; SUBCUTANEOUS at 21:33

## 2018-10-04 RX ADMIN — Medication 600 MILLIGRAM(S): at 22:00

## 2018-10-04 RX ADMIN — Medication 600 MILLIGRAM(S): at 18:25

## 2018-10-04 RX ADMIN — OXYCODONE AND ACETAMINOPHEN 1 TABLET(S): 5; 325 TABLET ORAL at 21:36

## 2018-10-04 RX ADMIN — FENTANYL CITRATE 50 MICROGRAM(S): 50 INJECTION INTRAVENOUS at 15:24

## 2018-10-04 RX ADMIN — Medication 600 MILLIGRAM(S): at 18:26

## 2018-10-04 RX ADMIN — OXYCODONE AND ACETAMINOPHEN 1 TABLET(S): 5; 325 TABLET ORAL at 22:00

## 2018-10-04 NOTE — BRIEF OPERATIVE NOTE - PROCEDURE
<<-----Click on this checkbox to enter Procedure Sacrospinous vaginal vault suspension  10/04/2018  with mesh  Active  FELIPA  Perineoplasty, nonobstetrical  10/04/2018    Active  FELIPA  Exam under anesthesia, gynecologic  10/04/2018    Active  FELIPA  Creation of mid urethral sling in female  10/04/2018  Eliana  Active  FELIPA  Cystoscopy  10/04/2018    Active  FELIPA  Enterocele repair  10/04/2018  with mesh  Active  FELIPA  Rectocele repair  10/04/2018  with mesh  Active  FELIPA Creation of mid urethral sling in female  10/04/2018  Eliana  Active  Deuce Miranda  Cystoscopy  10/04/2018    Deuce Vázquez  Enterocele repair  10/04/2018  with mesh  Deuce Vázquez  Rectocele repair  10/04/2018  with mesh  Active  Deuce Miranda  Exam under anesthesia, gynecologic  10/04/2018    Deuce Vázquez  Perineoplasty, nonobstetrical  10/04/2018    Deuce Vázquez  Sacrospinous vaginal vault suspension  10/04/2018  with mesh  Active  Deuce Miranda

## 2018-10-04 NOTE — BRIEF OPERATIVE NOTE - OPERATION/FINDINGS
large posterior wall defect with Stage I uterine prolapse; normal bladder; significant descent of the UVN

## 2018-10-04 NOTE — ASU PATIENT PROFILE, ADULT - PMH
Asthma    GERD (gastroesophageal reflux disease)    Hernia    Hyperlipidemia, unspecified hyperlipidemia type

## 2018-10-04 NOTE — ASU PATIENT PROFILE, ADULT - PSH
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

## 2018-10-04 NOTE — BRIEF OPERATIVE NOTE - PRE-OP DX
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

## 2018-10-04 NOTE — BRIEF OPERATIVE NOTE - POST-OP DX
Enterocele  10/04/2018    Active  Deuce Miranda  Loss of perineal body, female  10/04/2018    Active  Deuce Miranda  Stress incontinence, female  10/04/2018    Active  Deuce Miranda  Urethrocele, female  10/04/2018    Deuce Vázquez  Uterine prolapse  10/04/2018    Deuce Vázquez  Vaginal vault prolapse  10/04/2018    Active  Deuce Miranda

## 2018-10-04 NOTE — ASU DISCHARGE PLAN (ADULT/PEDIATRIC). - NOTIFY
Fever greater than 101/heavy vaginal  bleeding/Pain not relieved by Medications/Persistent Nausea and Vomiting/Unable to Urinate

## 2018-10-05 VITALS
SYSTOLIC BLOOD PRESSURE: 106 MMHG | TEMPERATURE: 99 F | OXYGEN SATURATION: 98 % | HEART RATE: 68 BPM | RESPIRATION RATE: 16 BRPM | DIASTOLIC BLOOD PRESSURE: 49 MMHG

## 2018-10-05 RX ORDER — INFLUENZA VIRUS VACCINE 15; 15; 15; 15 UG/.5ML; UG/.5ML; UG/.5ML; UG/.5ML
0.5 SUSPENSION INTRAMUSCULAR ONCE
Qty: 0 | Refills: 0 | Status: COMPLETED | OUTPATIENT
Start: 2018-10-05 | End: 2018-10-05

## 2018-10-05 RX ORDER — INFLUENZA VIRUS VACCINE 15; 15; 15; 15 UG/.5ML; UG/.5ML; UG/.5ML; UG/.5ML
0.5 SUSPENSION INTRAMUSCULAR ONCE
Qty: 0 | Refills: 0 | Status: DISCONTINUED | OUTPATIENT
Start: 2018-10-05 | End: 2018-10-05

## 2018-10-05 RX ADMIN — Medication 600 MILLIGRAM(S): at 07:07

## 2018-10-05 RX ADMIN — OXYCODONE AND ACETAMINOPHEN 2 TABLET(S): 5; 325 TABLET ORAL at 02:29

## 2018-10-05 RX ADMIN — OXYCODONE AND ACETAMINOPHEN 2 TABLET(S): 5; 325 TABLET ORAL at 01:49

## 2018-10-05 RX ADMIN — SODIUM CHLORIDE 75 MILLILITER(S): 9 INJECTION, SOLUTION INTRAVENOUS at 01:53

## 2018-10-05 RX ADMIN — INFLUENZA VIRUS VACCINE 0.5 MILLILITER(S): 15; 15; 15; 15 SUSPENSION INTRAMUSCULAR at 09:30

## 2018-10-05 RX ADMIN — Medication 600 MILLIGRAM(S): at 06:11

## 2018-10-05 RX ADMIN — SODIUM CHLORIDE 3 MILLILITER(S): 9 INJECTION INTRAMUSCULAR; INTRAVENOUS; SUBCUTANEOUS at 05:29

## 2018-10-05 NOTE — PROGRESS NOTE ADULT - SUBJECTIVE AND OBJECTIVE BOX
Postoperative day: 1 s/p VMR MUS  patient resting comfortably  complaints: none  OR findings and course d/w patient in detail -- all questions answered  nursing input solicited  Focused ROS: negative    Physical exam:    Vital Signs Last 24 Hrs  T(C): 37 (05 Oct 2018 03:18), Max: 37.1 (04 Oct 2018 16:15)  T(F): 98.6 (05 Oct 2018 03:18), Max: 98.8 (04 Oct 2018 16:15)  HR: 68 (05 Oct 2018 03:18) (67 - 91)  BP: 106/49 (05 Oct 2018 03:18) (106/49 - 151/83)  BP(mean): --  RR: 16 (05 Oct 2018 03:18) (10 - 16)  SpO2: 98% (05 Oct 2018 03:18) (96% - 99%)      Abdomen: Soft,  appropriately tender, non-distended  Incision is clean dry and intact  Vagina: minimal bleeding  Ext: lower extremities symmetric and without calf tenderness    LABS:                        12.4   x     )-----------( x        ( 04 Oct 2018 21:07 )             36.3             Allergies    ampicillin (Rash; Short breath)  Cipro (Vomiting; Nausea)  codeine (Short breath; Other)    Intolerances          Assessment and Plan  Doing well. Passed voiding trial this AM -- voided 250cc  Tolerating regular diet.  Routine post op care.  Plan discharge home  --- routine restrictions  patient given written and verbal discharge instructions

## 2018-10-08 LAB — SURGICAL PATHOLOGY FINAL REPORT - CH: SIGNIFICANT CHANGE UP

## 2018-10-10 DIAGNOSIS — Z90.49 ACQUIRED ABSENCE OF OTHER SPECIFIED PARTS OF DIGESTIVE TRACT: ICD-10-CM

## 2018-10-10 DIAGNOSIS — E78.5 HYPERLIPIDEMIA, UNSPECIFIED: ICD-10-CM

## 2018-10-10 DIAGNOSIS — J44.9 CHRONIC OBSTRUCTIVE PULMONARY DISEASE, UNSPECIFIED: ICD-10-CM

## 2018-10-10 DIAGNOSIS — N81.4 UTEROVAGINAL PROLAPSE, UNSPECIFIED: ICD-10-CM

## 2018-10-10 DIAGNOSIS — K21.9 GASTRO-ESOPHAGEAL REFLUX DISEASE WITHOUT ESOPHAGITIS: ICD-10-CM

## 2018-10-10 DIAGNOSIS — R23.4 CHANGES IN SKIN TEXTURE: ICD-10-CM

## 2018-10-10 DIAGNOSIS — Z88.0 ALLERGY STATUS TO PENICILLIN: ICD-10-CM

## 2018-10-10 DIAGNOSIS — Z87.891 PERSONAL HISTORY OF NICOTINE DEPENDENCE: ICD-10-CM

## 2018-10-10 DIAGNOSIS — N39.3 STRESS INCONTINENCE (FEMALE) (MALE): ICD-10-CM

## 2018-10-15 ENCOUNTER — RX RENEWAL (OUTPATIENT)
Age: 68
End: 2018-10-15

## 2018-10-25 ENCOUNTER — RX RENEWAL (OUTPATIENT)
Age: 68
End: 2018-10-25

## 2018-11-05 ENCOUNTER — RX RENEWAL (OUTPATIENT)
Age: 68
End: 2018-11-05

## 2018-11-14 ENCOUNTER — OTHER (OUTPATIENT)
Age: 68
End: 2018-11-14

## 2018-11-15 LAB — HBA1C MFR BLD HPLC: 5.4

## 2018-11-19 ENCOUNTER — NON-APPOINTMENT (OUTPATIENT)
Age: 68
End: 2018-11-19

## 2018-11-19 ENCOUNTER — APPOINTMENT (OUTPATIENT)
Dept: INTERNAL MEDICINE | Facility: CLINIC | Age: 68
End: 2018-11-19
Payer: MEDICARE

## 2018-11-19 VITALS
WEIGHT: 170 LBS | TEMPERATURE: 98.3 F | RESPIRATION RATE: 20 BRPM | SYSTOLIC BLOOD PRESSURE: 142 MMHG | DIASTOLIC BLOOD PRESSURE: 92 MMHG | HEART RATE: 92 BPM | HEIGHT: 65 IN | OXYGEN SATURATION: 97 % | BODY MASS INDEX: 28.32 KG/M2

## 2018-11-19 PROCEDURE — G0008: CPT

## 2018-11-19 PROCEDURE — 99215 OFFICE O/P EST HI 40 MIN: CPT | Mod: 25

## 2018-11-19 PROCEDURE — 94060 EVALUATION OF WHEEZING: CPT

## 2018-11-19 PROCEDURE — 90662 IIV NO PRSV INCREASED AG IM: CPT

## 2018-11-19 RX ORDER — BUDESONIDE AND FORMOTEROL FUMARATE DIHYDRATE 160; 4.5 UG/1; UG/1
160-4.5 AEROSOL RESPIRATORY (INHALATION)
Qty: 3 | Refills: 1 | Status: DISCONTINUED | COMMUNITY
Start: 2018-09-27 | End: 2018-11-19

## 2018-11-19 RX ORDER — ROSUVASTATIN CALCIUM 20 MG/1
20 TABLET, FILM COATED ORAL
Refills: 0 | Status: DISCONTINUED | COMMUNITY
End: 2018-11-19

## 2018-11-19 RX ORDER — PREDNISONE 20 MG/1
20 TABLET ORAL TWICE DAILY
Qty: 12 | Refills: 0 | Status: DISCONTINUED | COMMUNITY
Start: 2018-09-27 | End: 2018-11-19

## 2018-11-19 NOTE — REVIEW OF SYSTEMS
[Incontinence] : incontinence [Frequency] : frequency [Anxiety] : anxiety [Negative] : Heme/Lymph [Hematuria] : no hematuria [Vaginal Discharge] : no vaginal discharge [Suicidal] : not suicidal [Insomnia] : no insomnia [Depression] : no depression

## 2018-11-19 NOTE — HISTORY OF PRESENT ILLNESS
[FreeTextEntry1] : COPD, Hyperlipidemia, anxiety, urinary frequency, oral thrush. [de-identified] : Patient arrives today feeling relatively well.  \par She has not been using Symbicort for maintenance therapy, rather she is using Albuterol occasionally for dyspnea on exertion.  The most she has used this inhaler was 3 x one week, does not need every week.  Thinks the Symbicort irritates her mouth and throat even though she rinses her mouth out after use.\par Reports occasional anxiety which is relieved by Xanax 0.5 mg. "Tries to use sparingly."\par Compliant with Rosuvastatin 30 mg daily, reports rare to occasional lower extremity muscle cramping, although admits she had these episodes before starting on a statin.  "Tries" to maintain a low cholesterol, low fat diet.  Has gained back 10 pounds of a 20 pound weight loss, expresses desire to lose what she gained back.\par Reports that she thinks she has a UTI because of urinary frequency and urgency.  S/p sling procedure to lift bladder through Dr. Miranda, has had multiple UTI's in the past.\par SEE ROS:\par

## 2018-11-19 NOTE — PHYSICAL EXAM
[No Acute Distress] : no acute distress [Well Nourished] : well nourished [Well Developed] : well developed [Normal Sclera/Conjunctiva] : normal sclera/conjunctiva [Normal TMs] : both tympanic membranes were normal [Supple] : supple [No Respiratory Distress] : no respiratory distress  [Clear to Auscultation] : lungs were clear to auscultation bilaterally [Normal Rate] : normal rate  [Regular Rhythm] : with a regular rhythm [No Edema] : there was no peripheral edema [Soft] : abdomen soft [Non Tender] : non-tender [Normal Bowel Sounds] : normal bowel sounds [Normal Supraclavicular Nodes] : no supraclavicular lymphadenopathy [Normal Posterior Cervical Nodes] : no posterior cervical lymphadenopathy [Normal Anterior Cervical Nodes] : no anterior cervical lymphadenopathy [No CVA Tenderness] : no CVA  tenderness [No Spinal Tenderness] : no spinal tenderness [No Joint Swelling] : no joint swelling [Grossly Normal Strength/Tone] : grossly normal strength/tone [No Rash] : no rash [No Focal Deficits] : no focal deficits [Normal Affect] : the affect was normal [Normal Insight/Judgement] : insight and judgment were intact [de-identified] : Oropharynx and tongue erythematous and "beefy."  No exudates noted.

## 2018-11-19 NOTE — PLAN
[FreeTextEntry1] : Advised if Albuterol use becomes more than 2 x weekly she must notify and be started on a different maintenance inhaler, rationale explained, patient voices understanding.\par Pneumonia vaccine (23) administered today by writer.\par Received Influenza vaccine last week.\par Reviewed all labs dated 11/10/18.\par Start Mycelex Troches 5 tabs oral daily, slow dissolve for 10 days.\par Obtain U/A with C&S.\par Start Nitrofurantoin 100 mg 1 tab BID for 7 days.\par Continue current medications as prescribed.\par Adhere to low cholesterol, low fat diet.\par Exercise, target 5 lb weight loss.\par OV 6 months with full PFT.\par Call office if no resolve of UTI symptoms.\par

## 2018-11-30 ENCOUNTER — RX RENEWAL (OUTPATIENT)
Age: 68
End: 2018-11-30

## 2019-01-10 ENCOUNTER — RX RENEWAL (OUTPATIENT)
Age: 69
End: 2019-01-10

## 2019-02-11 PROBLEM — K46.9 UNSPECIFIED ABDOMINAL HERNIA WITHOUT OBSTRUCTION OR GANGRENE: Chronic | Status: ACTIVE | Noted: 2018-09-24

## 2019-02-11 PROBLEM — E78.5 HYPERLIPIDEMIA, UNSPECIFIED: Chronic | Status: ACTIVE | Noted: 2018-09-24

## 2019-02-11 PROBLEM — J45.909 UNSPECIFIED ASTHMA, UNCOMPLICATED: Chronic | Status: ACTIVE | Noted: 2018-09-24

## 2019-02-11 PROBLEM — K21.9 GASTRO-ESOPHAGEAL REFLUX DISEASE WITHOUT ESOPHAGITIS: Chronic | Status: ACTIVE | Noted: 2018-10-04

## 2019-02-26 ENCOUNTER — RESULT REVIEW (OUTPATIENT)
Age: 69
End: 2019-02-26

## 2019-02-26 ENCOUNTER — RX RENEWAL (OUTPATIENT)
Age: 69
End: 2019-02-26

## 2019-03-11 ENCOUNTER — APPOINTMENT (OUTPATIENT)
Age: 69
End: 2019-03-11
Payer: MEDICARE

## 2019-03-11 VITALS
DIASTOLIC BLOOD PRESSURE: 91 MMHG | HEIGHT: 65 IN | SYSTOLIC BLOOD PRESSURE: 183 MMHG | WEIGHT: 180 LBS | BODY MASS INDEX: 29.99 KG/M2 | OXYGEN SATURATION: 97 % | HEART RATE: 91 BPM

## 2019-03-11 VITALS — DIASTOLIC BLOOD PRESSURE: 83 MMHG | SYSTOLIC BLOOD PRESSURE: 131 MMHG

## 2019-03-11 DIAGNOSIS — Z87.891 PERSONAL HISTORY OF NICOTINE DEPENDENCE: ICD-10-CM

## 2019-03-11 PROCEDURE — 99204 OFFICE O/P NEW MOD 45 MIN: CPT

## 2019-03-11 RX ORDER — UBIDECARENONE 200 MG
CAPSULE ORAL
Refills: 0 | Status: DISCONTINUED | COMMUNITY
End: 2019-03-11

## 2019-03-11 RX ORDER — OMEGA-3/DHA/EPA/FISH OIL 300-1000MG
CAPSULE ORAL
Refills: 0 | Status: DISCONTINUED | COMMUNITY
End: 2019-03-11

## 2019-03-14 NOTE — PHYSICAL EXAM

## 2019-03-14 NOTE — HISTORY OF PRESENT ILLNESS
[FreeTextEntry1] : This patient had undergone a cystocele repair and states that she feels as though her urinary stream is restricted and she notes urinary frequency as well.

## 2019-03-14 NOTE — REVIEW OF SYSTEMS
[Dry Eyes] : dryness of the eyes [Wheezing] : wheezing [Constipation] : constipation [Diarrhea] : diarrhea [Urine Infection (bladder/kidney)] : bladder/kidney infection [Wake up at night to urinate  How many times?  ___] : wakes up to urinate [unfilled] times during the night [Bladder pressure] : experiences bladder pressure [Hot Flashes] : hot flashes [see HPI] : see HPI [Negative] : Endocrine [FreeTextEntry6] : frequent urination [FreeTextEntry3] : leak and dribbling of urine (more than a leak-very heavy)

## 2019-03-16 LAB
APPEARANCE: ABNORMAL
BACTERIA UR CULT: ABNORMAL
BACTERIA: ABNORMAL
BILIRUBIN URINE: NEGATIVE
BLOOD URINE: NEGATIVE
CALCIUM OXALATE CRYSTALS: ABNORMAL
COLOR: YELLOW
GLUCOSE QUALITATIVE U: NEGATIVE
HYALINE CASTS: 0 /LPF
KETONES URINE: NEGATIVE
LEUKOCYTE ESTERASE URINE: ABNORMAL
MICROSCOPIC-UA: NORMAL
NITRITE URINE: NEGATIVE
PH URINE: 6
PROTEIN URINE: ABNORMAL
RED BLOOD CELLS URINE: 2 /HPF
SPECIFIC GRAVITY URINE: 1.03
SQUAMOUS EPITHELIAL CELLS: 27 /HPF
UROBILINOGEN URINE: NORMAL
WHITE BLOOD CELLS URINE: 93 /HPF

## 2019-03-28 ENCOUNTER — APPOINTMENT (OUTPATIENT)
Dept: UROLOGY | Facility: CLINIC | Age: 69
End: 2019-03-28
Payer: MEDICARE

## 2019-03-28 VITALS
TEMPERATURE: 98.3 F | WEIGHT: 180 LBS | SYSTOLIC BLOOD PRESSURE: 151 MMHG | DIASTOLIC BLOOD PRESSURE: 90 MMHG | HEIGHT: 65 IN | BODY MASS INDEX: 29.99 KG/M2 | OXYGEN SATURATION: 96 % | HEART RATE: 83 BPM

## 2019-03-28 PROCEDURE — 52000 CYSTOURETHROSCOPY: CPT

## 2019-04-02 ENCOUNTER — APPOINTMENT (OUTPATIENT)
Dept: UROLOGY | Facility: CLINIC | Age: 69
End: 2019-04-02
Payer: MEDICARE

## 2019-04-02 VITALS
WEIGHT: 180 LBS | DIASTOLIC BLOOD PRESSURE: 89 MMHG | OXYGEN SATURATION: 95 % | SYSTOLIC BLOOD PRESSURE: 134 MMHG | BODY MASS INDEX: 29.99 KG/M2 | HEIGHT: 65 IN | HEART RATE: 89 BPM

## 2019-04-02 PROCEDURE — 51798 US URINE CAPACITY MEASURE: CPT | Mod: 59

## 2019-04-02 PROCEDURE — 51728 CYSTOMETROGRAM W/VP: CPT

## 2019-04-02 PROCEDURE — 51797 INTRAABDOMINAL PRESSURE TEST: CPT

## 2019-04-02 PROCEDURE — 51741 ELECTRO-UROFLOWMETRY FIRST: CPT

## 2019-04-02 PROCEDURE — 51784 ANAL/URINARY MUSCLE STUDY: CPT

## 2019-04-05 ENCOUNTER — RX RENEWAL (OUTPATIENT)
Age: 69
End: 2019-04-05

## 2019-04-08 ENCOUNTER — RX RENEWAL (OUTPATIENT)
Age: 69
End: 2019-04-08

## 2019-04-16 ENCOUNTER — APPOINTMENT (OUTPATIENT)
Dept: UROLOGY | Facility: CLINIC | Age: 69
End: 2019-04-16
Payer: MEDICARE

## 2019-04-16 DIAGNOSIS — N39.0 URINARY TRACT INFECTION, SITE NOT SPECIFIED: ICD-10-CM

## 2019-04-16 PROCEDURE — 99213 OFFICE O/P EST LOW 20 MIN: CPT

## 2019-04-17 ENCOUNTER — MEDICATION RENEWAL (OUTPATIENT)
Age: 69
End: 2019-04-17

## 2019-04-17 LAB
APPEARANCE: CLEAR
BACTERIA: ABNORMAL
BILIRUBIN URINE: NEGATIVE
BLOOD URINE: NEGATIVE
COLOR: YELLOW
GLUCOSE QUALITATIVE U: NEGATIVE
HYALINE CASTS: 7 /LPF
KETONES URINE: NEGATIVE
LEUKOCYTE ESTERASE URINE: ABNORMAL
MICROSCOPIC-UA: NORMAL
NITRITE URINE: NEGATIVE
PH URINE: 6
PROTEIN URINE: NEGATIVE
RED BLOOD CELLS URINE: 2 /HPF
SPECIFIC GRAVITY URINE: 1.02
SQUAMOUS EPITHELIAL CELLS: 7 /HPF
UROBILINOGEN URINE: NORMAL
WHITE BLOOD CELLS URINE: 17 /HPF

## 2019-04-17 NOTE — HISTORY OF PRESENT ILLNESS
[FreeTextEntry1] : This patient presents for a followup after cystometric. She has found that Toviaz is what works best for her.

## 2019-04-19 LAB — BACTERIA UR CULT: NORMAL

## 2019-04-23 ENCOUNTER — APPOINTMENT (OUTPATIENT)
Age: 69
End: 2019-04-23
Payer: MEDICARE

## 2019-04-23 VITALS
HEART RATE: 88 BPM | BODY MASS INDEX: 29.99 KG/M2 | OXYGEN SATURATION: 97 % | SYSTOLIC BLOOD PRESSURE: 137 MMHG | DIASTOLIC BLOOD PRESSURE: 87 MMHG | TEMPERATURE: 98.4 F | WEIGHT: 180 LBS | HEIGHT: 65 IN

## 2019-04-23 PROCEDURE — 51700 IRRIGATION OF BLADDER: CPT

## 2019-04-30 ENCOUNTER — APPOINTMENT (OUTPATIENT)
Dept: UROLOGY | Facility: CLINIC | Age: 69
End: 2019-04-30
Payer: MEDICARE

## 2019-04-30 VITALS — DIASTOLIC BLOOD PRESSURE: 82 MMHG | SYSTOLIC BLOOD PRESSURE: 134 MMHG | HEART RATE: 80 BPM

## 2019-04-30 DIAGNOSIS — N39.3 STRESS INCONTINENCE (FEMALE) (MALE): ICD-10-CM

## 2019-04-30 PROCEDURE — 51700 IRRIGATION OF BLADDER: CPT

## 2019-05-07 ENCOUNTER — APPOINTMENT (OUTPATIENT)
Dept: UROLOGY | Facility: CLINIC | Age: 69
End: 2019-05-07
Payer: MEDICARE

## 2019-05-07 PROCEDURE — 51700 IRRIGATION OF BLADDER: CPT

## 2019-05-14 ENCOUNTER — APPOINTMENT (OUTPATIENT)
Dept: UROLOGY | Facility: CLINIC | Age: 69
End: 2019-05-14
Payer: MEDICARE

## 2019-05-14 VITALS — HEART RATE: 77 BPM | OXYGEN SATURATION: 99 % | SYSTOLIC BLOOD PRESSURE: 153 MMHG | DIASTOLIC BLOOD PRESSURE: 85 MMHG

## 2019-05-14 PROCEDURE — 51700 IRRIGATION OF BLADDER: CPT

## 2019-05-16 LAB
APPEARANCE: ABNORMAL
BACTERIA: ABNORMAL
BILIRUBIN URINE: NEGATIVE
BLOOD URINE: NEGATIVE
COLOR: NORMAL
GLUCOSE QUALITATIVE U: NEGATIVE
HYALINE CASTS: 0 /LPF
KETONES URINE: NEGATIVE
LEUKOCYTE ESTERASE URINE: NEGATIVE
MICROSCOPIC-UA: NORMAL
NITRITE URINE: NEGATIVE
PH URINE: 6
PROTEIN URINE: NORMAL
RED BLOOD CELLS URINE: 2 /HPF
SPECIFIC GRAVITY URINE: 1.02
SQUAMOUS EPITHELIAL CELLS: 15 /HPF
UROBILINOGEN URINE: NORMAL
WHITE BLOOD CELLS URINE: 8 /HPF

## 2019-05-20 ENCOUNTER — APPOINTMENT (OUTPATIENT)
Dept: INTERNAL MEDICINE | Facility: CLINIC | Age: 69
End: 2019-05-20
Payer: MEDICARE

## 2019-05-20 VITALS
DIASTOLIC BLOOD PRESSURE: 86 MMHG | OXYGEN SATURATION: 96 % | RESPIRATION RATE: 20 BRPM | TEMPERATURE: 98.9 F | BODY MASS INDEX: 29.16 KG/M2 | HEART RATE: 94 BPM | HEIGHT: 65 IN | WEIGHT: 175 LBS | SYSTOLIC BLOOD PRESSURE: 136 MMHG

## 2019-05-20 DIAGNOSIS — J30.9 ALLERGIC RHINITIS, UNSPECIFIED: ICD-10-CM
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
## 2019-05-20 LAB
25(OH)D3 SERPL-MCNC: 48.2 NG/ML
ALBUMIN SERPL ELPH-MCNC: 4.6 G/DL
ALP BLD-CCNC: 71 U/L
ALT SERPL-CCNC: 19 U/L
ANION GAP SERPL CALC-SCNC: 13 MMOL/L
AST SERPL-CCNC: 27 U/L
BASOPHILS # BLD AUTO: 0.05 K/UL
BASOPHILS NFR BLD AUTO: 1 %
BILIRUB SERPL-MCNC: 0.3 MG/DL
BUN SERPL-MCNC: 20 MG/DL
CALCIUM SERPL-MCNC: 10.1 MG/DL
CHLORIDE SERPL-SCNC: 102 MMOL/L
CHOLEST SERPL-MCNC: 170 MG/DL
CHOLEST/HDLC SERPL: 4.7 RATIO
CO2 SERPL-SCNC: 25 MMOL/L
CREAT SERPL-MCNC: 0.98 MG/DL
EOSINOPHIL # BLD AUTO: 0.2 K/UL
EOSINOPHIL NFR BLD AUTO: 4.1 %
ESTIMATED AVERAGE GLUCOSE: 123 MG/DL
FERRITIN SERPL-MCNC: 125 NG/ML
GLUCOSE SERPL-MCNC: 106 MG/DL
HBA1C MFR BLD HPLC: 5.9 %
HCT VFR BLD CALC: 45 %
HDLC SERPL-MCNC: 36 MG/DL
HGB BLD-MCNC: 14.7 G/DL
IMM GRANULOCYTES NFR BLD AUTO: 0.4 %
IRON SATN MFR SERPL: 32 %
IRON SERPL-MCNC: 140 UG/DL
LDLC SERPL CALC-MCNC: 80 MG/DL
LYMPHOCYTES # BLD AUTO: 2.02 K/UL
LYMPHOCYTES NFR BLD AUTO: 41.5 %
MAGNESIUM SERPL-MCNC: 2 MG/DL
MAN DIFF?: NORMAL
MCHC RBC-ENTMCNC: 30.5 PG
MCHC RBC-ENTMCNC: 32.7 GM/DL
MCV RBC AUTO: 93.4 FL
MONOCYTES # BLD AUTO: 0.43 K/UL
MONOCYTES NFR BLD AUTO: 8.8 %
NEUTROPHILS # BLD AUTO: 2.15 K/UL
NEUTROPHILS NFR BLD AUTO: 44.2 %
PLATELET # BLD AUTO: 250 K/UL
POTASSIUM SERPL-SCNC: 5.4 MMOL/L
PROT SERPL-MCNC: 7.4 G/DL
RBC # BLD: 4.82 M/UL
RBC # FLD: 12.7 %
SODIUM SERPL-SCNC: 140 MMOL/L
TIBC SERPL-MCNC: 431 UG/DL
TRANSFERRIN SERPL-MCNC: 345 MG/DL
TRIGL SERPL-MCNC: 271 MG/DL
TSH SERPL-ACNC: 3.73 UIU/ML
UIBC SERPL-MCNC: 291 UG/DL
WBC # FLD AUTO: 4.87 K/UL

## 2019-05-20 PROCEDURE — 99397 PER PM REEVAL EST PAT 65+ YR: CPT

## 2019-05-20 RX ORDER — POTASSIUM CHLORIDE 1500 MG/1
20 TABLET, EXTENDED RELEASE ORAL
Qty: 90 | Refills: 0 | Status: DISCONTINUED | COMMUNITY
Start: 2017-05-03 | End: 2019-05-20

## 2019-05-20 RX ORDER — UBIDECARENONE 200 MG
200 CAPSULE ORAL
Refills: 0 | Status: DISCONTINUED | COMMUNITY

## 2019-05-20 RX ORDER — CALCIUM CARBONATE/VITAMIN D3 600 MG-10
TABLET ORAL
Refills: 0 | Status: COMPLETED | COMMUNITY
End: 2019-05-20

## 2019-05-20 RX ORDER — NITROFURANTOIN (MONOHYDRATE/MACROCRYSTALS) 25; 75 MG/1; MG/1
100 CAPSULE ORAL TWICE DAILY
Qty: 14 | Refills: 0 | Status: COMPLETED | COMMUNITY
Start: 2018-11-19 | End: 2019-05-20

## 2019-05-20 RX ORDER — CEFUROXIME AXETIL 500 MG/1
500 TABLET ORAL
Qty: 14 | Refills: 0 | Status: COMPLETED | COMMUNITY
Start: 2017-06-13 | End: 2019-05-20

## 2019-05-20 RX ORDER — NITROFURANTOIN MACROCRYSTALS 100 MG/1
100 CAPSULE ORAL 3 TIMES DAILY
Qty: 15 | Refills: 2 | Status: COMPLETED | COMMUNITY
Start: 2019-03-28 | End: 2019-05-20

## 2019-05-20 RX ORDER — VITAMIN E ACID SUCCINATE 268 MG
TABLET ORAL
Refills: 0 | Status: COMPLETED | COMMUNITY
End: 2019-05-20

## 2019-05-20 RX ORDER — FOLIC ACID 0.8 MG
500 TABLET ORAL
Refills: 0 | Status: DISCONTINUED | COMMUNITY

## 2019-05-20 RX ORDER — ROSUVASTATIN CALCIUM 10 MG/1
10 TABLET, FILM COATED ORAL
Refills: 0 | Status: COMPLETED | COMMUNITY
End: 2019-05-20

## 2019-05-20 NOTE — PHYSICAL EXAM
[No Acute Distress] : no acute distress [Well Nourished] : well nourished [Well Developed] : well developed [Normal Oropharynx] : the oropharynx was normal [Supple] : supple [No Lymphadenopathy] : no lymphadenopathy [No Respiratory Distress] : no respiratory distress  [Clear to Auscultation] : lungs were clear to auscultation bilaterally [No Accessory Muscle Use] : no accessory muscle use [Normal Rate] : normal rate  [Regular Rhythm] : with a regular rhythm [Normal S1, S2] : normal S1 and S2 [Pedal Pulses Present] : the pedal pulses are present [No Extremity Clubbing/Cyanosis] : no extremity clubbing/cyanosis [Soft] : abdomen soft [Non Tender] : non-tender [Normal Bowel Sounds] : normal bowel sounds [Normal Posterior Cervical Nodes] : no posterior cervical lymphadenopathy [Normal Anterior Cervical Nodes] : no anterior cervical lymphadenopathy [Normal Gait] : normal gait [No Focal Deficits] : no focal deficits [Coordination Grossly Intact] : coordination grossly intact [Normal Affect] : the affect was normal [Alert and Oriented x3] : oriented to person, place, and time [Normal Insight/Judgement] : insight and judgment were intact

## 2019-05-20 NOTE — REVIEW OF SYSTEMS
[Negative] : Psychiatric [Fever] : no fever [Chills] : no chills [Fatigue] : no fatigue [Shortness Of Breath] : no shortness of breath [Wheezing] : no wheezing [Cough] : no cough [de-identified] : see HPi

## 2019-05-20 NOTE — ASSESSMENT
[FreeTextEntry1] : Medications renewed as requested\par Lyme titer script given \par Pt instructed if using Proair great than twice / weekly will need to restart maintenance inhaler, pt states understanding \par reviewed labs date 5/15/2019 \par F/up Dr. Miranda, GYN\par F/up Dr Nicole , urology\par  Strict NCS, low fat/ chol diet \par exercise as tolerated \par OV 6 months / sooner as needed \par \par

## 2019-05-20 NOTE — HISTORY OF PRESENT ILLNESS
[FreeTextEntry1] : CPE [de-identified] : Pt here for yearly followup. She feels well overall. She has a history of allergic rhinitis and COPD with Asthma. She stopped Flonase but would like to restart it now in Spring.  .She uses Proair PRN , has used it sparingly over the last few weeks. She used to be maintained on Symbicort for maintenance therapy , stopped it  over a year ago as it was making her " hoarse." States has not needed her rescue inhaler . \par Reports uses Xanax o.5 mg po sparingly . \par She has been compliant with Rosuvastatin 30 mg po daily . She tries to eat a low chol/ fat diet. Admits to being bad 'with sugary foods.' \par She sees Dr. Nicole, urology  and Dr. Miranda, GYN regularly for s/p sling procedure bladder lift. . She has a Histor  of chronic UTI's in  the  past.\par She denies fever, chills, chest pain, palpitations, abdominal pain. \par She woke up a few weeks ago with a " welt and bug bite on her abdomen. Would like a LYme titer script today.

## 2019-05-23 ENCOUNTER — RX RENEWAL (OUTPATIENT)
Age: 69
End: 2019-05-23

## 2019-05-28 ENCOUNTER — APPOINTMENT (OUTPATIENT)
Dept: UROLOGY | Facility: CLINIC | Age: 69
End: 2019-05-28
Payer: MEDICARE

## 2019-05-28 VITALS — HEART RATE: 75 BPM | SYSTOLIC BLOOD PRESSURE: 133 MMHG | DIASTOLIC BLOOD PRESSURE: 78 MMHG | OXYGEN SATURATION: 97 %

## 2019-05-28 PROCEDURE — 51700 IRRIGATION OF BLADDER: CPT

## 2019-06-11 ENCOUNTER — APPOINTMENT (OUTPATIENT)
Dept: UROLOGY | Facility: CLINIC | Age: 69
End: 2019-06-11
Payer: MEDICARE

## 2019-06-11 PROCEDURE — 51700 IRRIGATION OF BLADDER: CPT

## 2019-06-25 ENCOUNTER — APPOINTMENT (OUTPATIENT)
Dept: UROLOGY | Facility: CLINIC | Age: 69
End: 2019-06-25
Payer: MEDICARE

## 2019-06-25 ENCOUNTER — APPOINTMENT (OUTPATIENT)
Dept: INTERNAL MEDICINE | Facility: CLINIC | Age: 69
End: 2019-06-25
Payer: MEDICARE

## 2019-06-25 VITALS — OXYGEN SATURATION: 95 % | HEART RATE: 80 BPM | DIASTOLIC BLOOD PRESSURE: 83 MMHG | SYSTOLIC BLOOD PRESSURE: 137 MMHG

## 2019-06-25 VITALS
HEIGHT: 65 IN | WEIGHT: 175 LBS | HEART RATE: 86 BPM | SYSTOLIC BLOOD PRESSURE: 130 MMHG | DIASTOLIC BLOOD PRESSURE: 80 MMHG | RESPIRATION RATE: 18 BRPM | BODY MASS INDEX: 29.16 KG/M2 | TEMPERATURE: 98.2 F | OXYGEN SATURATION: 97 %

## 2019-06-25 DIAGNOSIS — R32 UNSPECIFIED URINARY INCONTINENCE: ICD-10-CM

## 2019-06-25 DIAGNOSIS — J45.909 UNSPECIFIED ASTHMA, UNCOMPLICATED: ICD-10-CM

## 2019-06-25 PROCEDURE — 51700 IRRIGATION OF BLADDER: CPT

## 2019-06-25 PROCEDURE — 99214 OFFICE O/P EST MOD 30 MIN: CPT

## 2019-06-27 NOTE — ASSESSMENT
[FreeTextEntry1] : 1.sinusitis: Treat with prednisone taper and Augmentin. Went over instructions and side effects of medication.\par Increase fluids, rest. \par Take Tylenol 500 mg 1-2 tabs as needed every 8 hours for pain. \par Call for new or worsening symptoms.\par

## 2019-06-27 NOTE — HISTORY OF PRESENT ILLNESS
[FreeTextEntry8] : Ms. RUY GUAMAN is a 68 year F who comes in for an acute visit.\par H. is a 68-year-old female history of anxiety disorder, COPD with asthma, GERD, hypertension, hyperlipidemia comes in with complaints of sore throat for the past one day. She also has complaints of swollen glands full sinus congestion. Has also noticed a cough for the past 2 days with yellow sputum.\par Patient denies any cp, sob,abdominal pain, nausea, vomiting, palpitations, fever, chills, constipation, diarrhea.\par

## 2019-07-03 ENCOUNTER — RX RENEWAL (OUTPATIENT)
Age: 69
End: 2019-07-03

## 2019-07-09 ENCOUNTER — APPOINTMENT (OUTPATIENT)
Dept: UROLOGY | Facility: CLINIC | Age: 69
End: 2019-07-09
Payer: MEDICARE

## 2019-07-09 ENCOUNTER — RX RENEWAL (OUTPATIENT)
Age: 69
End: 2019-07-09

## 2019-07-09 VITALS — DIASTOLIC BLOOD PRESSURE: 87 MMHG | HEART RATE: 81 BPM | OXYGEN SATURATION: 97 % | SYSTOLIC BLOOD PRESSURE: 139 MMHG

## 2019-07-09 DIAGNOSIS — R35.0 FREQUENCY OF MICTURITION: ICD-10-CM

## 2019-07-09 LAB
BILIRUB UR QL STRIP: NORMAL
GLUCOSE UR-MCNC: NORMAL
HCG UR QL: 0.2 EU/DL
HGB UR QL STRIP.AUTO: NORMAL
KETONES UR-MCNC: NORMAL
LEUKOCYTE ESTERASE UR QL STRIP: NORMAL
NITRITE UR QL STRIP: NORMAL
PH UR STRIP: 5.5
PROT UR STRIP-MCNC: NORMAL
SP GR UR STRIP: 1.02

## 2019-07-09 PROCEDURE — 51700 IRRIGATION OF BLADDER: CPT

## 2019-07-09 PROCEDURE — 81003 URINALYSIS AUTO W/O SCOPE: CPT | Mod: QW

## 2019-07-10 LAB
APPEARANCE: CLEAR
BACTERIA: NEGATIVE
BILIRUBIN URINE: NEGATIVE
BLOOD URINE: NEGATIVE
COLOR: YELLOW
GLUCOSE QUALITATIVE U: NEGATIVE
HYALINE CASTS: 2 /LPF
KETONES URINE: NEGATIVE
LEUKOCYTE ESTERASE URINE: NEGATIVE
MICROSCOPIC-UA: NORMAL
NITRITE URINE: NEGATIVE
PH URINE: 6
PROTEIN URINE: NEGATIVE
RED BLOOD CELLS URINE: 0 /HPF
SPECIFIC GRAVITY URINE: 1.01
SQUAMOUS EPITHELIAL CELLS: 1 /HPF
UROBILINOGEN URINE: NORMAL
WHITE BLOOD CELLS URINE: 0 /HPF

## 2019-07-11 LAB
BACTERIA UR CULT: NORMAL
URINE CYTOLOGY: NORMAL

## 2019-07-30 ENCOUNTER — MEDICATION RENEWAL (OUTPATIENT)
Age: 69
End: 2019-07-30

## 2019-07-30 ENCOUNTER — APPOINTMENT (OUTPATIENT)
Dept: UROLOGY | Facility: CLINIC | Age: 69
End: 2019-07-30

## 2019-08-27 ENCOUNTER — RX RENEWAL (OUTPATIENT)
Age: 69
End: 2019-08-27

## 2019-10-14 ENCOUNTER — RX RENEWAL (OUTPATIENT)
Age: 69
End: 2019-10-14

## 2019-10-22 ENCOUNTER — RX RENEWAL (OUTPATIENT)
Age: 69
End: 2019-10-22

## 2019-11-06 ENCOUNTER — APPOINTMENT (OUTPATIENT)
Dept: INTERNAL MEDICINE | Facility: CLINIC | Age: 69
End: 2019-11-06
Payer: MEDICARE

## 2019-11-06 VITALS
BODY MASS INDEX: 29.02 KG/M2 | HEIGHT: 64 IN | HEART RATE: 98 BPM | WEIGHT: 170 LBS | RESPIRATION RATE: 20 BRPM | OXYGEN SATURATION: 97 % | TEMPERATURE: 98.6 F | DIASTOLIC BLOOD PRESSURE: 80 MMHG | SYSTOLIC BLOOD PRESSURE: 128 MMHG

## 2019-11-06 PROCEDURE — 99214 OFFICE O/P EST MOD 30 MIN: CPT

## 2019-11-06 RX ORDER — ALBUTEROL SULFATE 90 UG/1
108 (90 BASE) AEROSOL, METERED RESPIRATORY (INHALATION)
Qty: 1 | Refills: 1 | Status: DISCONTINUED | COMMUNITY
End: 2019-11-06

## 2019-11-06 RX ORDER — FESOTERODINE FUMARATE 8 MG/1
8 TABLET, FILM COATED, EXTENDED RELEASE ORAL
Qty: 90 | Refills: 3 | Status: DISCONTINUED | COMMUNITY
Start: 2019-04-17 | End: 2019-11-06

## 2019-11-06 RX ORDER — RANITIDINE 300 MG/1
300 TABLET ORAL
Qty: 90 | Refills: 1 | Status: DISCONTINUED | COMMUNITY
Start: 2018-04-09 | End: 2019-11-06

## 2019-11-06 NOTE — ASSESSMENT
[FreeTextEntry1] : 1.anxiety disorder: Xanax refilled today.\par \par 2.COPD with asthma: Pro-air inhaler refilled today. Patient would like refill of clotrimazole lozenges for history of thrush.\par \par 3.hyperlipidemia with hypertriglyceridemia: Patient on Crestor 30 mg daily with fenofibrate. Recheck fasting lipids.\par \par 4.hypertension: Continue current meds, furosemide refilled\par \par 5.health maintenance: Discuss receiving tetanus vaccine at the pharmacy, recent breast imaging done with GYN, discussed fasting blood work and patient elected to call for the results as well as having a copy mailed to her.

## 2019-11-06 NOTE — HISTORY OF PRESENT ILLNESS
[FreeTextEntry1] : RUY GUAMAN is a 68 year F who comes in for a follow up visit.\par  [de-identified] : Patient is a 68-year-old female history of breast lesion, COPD, hyperlipidemia, hypertension comes in for follow up visit. She needs multiple refills of her medications today. Due to limitations with her Medicaid and Medicare she is only allotted one more doctor visit this year. Therefore she will not be able to have a cologuard or colonoscopy this year. We discussed getting her tetanus vaccine at the pharmacy. She did have a repeat right mammogram as a followup for her breast lesion and was told it was stable and no further workup at this time. She follows regarding her breast lesion with GYN.\par Patient denies any cp, sob,abdominal pain, nausea, vomiting, palpitations, fever, chills, constipation, diarrhea.\par

## 2019-11-22 ENCOUNTER — RX RENEWAL (OUTPATIENT)
Age: 69
End: 2019-11-22

## 2020-06-01 ENCOUNTER — LABORATORY RESULT (OUTPATIENT)
Age: 70
End: 2020-06-01

## 2020-06-19 ENCOUNTER — APPOINTMENT (OUTPATIENT)
Dept: INTERNAL MEDICINE | Facility: CLINIC | Age: 70
End: 2020-06-19
Payer: MEDICARE

## 2020-06-19 VITALS
HEIGHT: 65 IN | HEART RATE: 96 BPM | DIASTOLIC BLOOD PRESSURE: 86 MMHG | OXYGEN SATURATION: 98 % | BODY MASS INDEX: 31.16 KG/M2 | RESPIRATION RATE: 16 BRPM | SYSTOLIC BLOOD PRESSURE: 136 MMHG | TEMPERATURE: 98.7 F | WEIGHT: 187 LBS

## 2020-06-19 DIAGNOSIS — N64.9 DISORDER OF BREAST, UNSPECIFIED: ICD-10-CM

## 2020-06-19 PROCEDURE — G0439: CPT

## 2020-11-06 ENCOUNTER — APPOINTMENT (OUTPATIENT)
Dept: INTERNAL MEDICINE | Facility: CLINIC | Age: 70
End: 2020-11-06
Payer: MEDICARE

## 2020-11-06 VITALS
OXYGEN SATURATION: 96 % | SYSTOLIC BLOOD PRESSURE: 124 MMHG | HEIGHT: 64 IN | TEMPERATURE: 97.2 F | HEART RATE: 82 BPM | RESPIRATION RATE: 16 BRPM | WEIGHT: 170 LBS | BODY MASS INDEX: 29.02 KG/M2 | DIASTOLIC BLOOD PRESSURE: 84 MMHG

## 2020-11-06 LAB
25(OH)D3 SERPL-MCNC: 57 NG/ML
ALBUMIN SERPL ELPH-MCNC: 4.9 G/DL
ALP BLD-CCNC: 68 U/L
ALT SERPL-CCNC: 23 U/L
ANION GAP SERPL CALC-SCNC: 18 MMOL/L
AST SERPL-CCNC: 27 U/L
BASOPHILS # BLD AUTO: 0.04 K/UL
BASOPHILS NFR BLD AUTO: 0.7 %
BILIRUB SERPL-MCNC: 0.4 MG/DL
BUN SERPL-MCNC: 19 MG/DL
CALCIUM SERPL-MCNC: 9.7 MG/DL
CHLORIDE SERPL-SCNC: 103 MMOL/L
CHOLEST SERPL-MCNC: 154 MG/DL
CO2 SERPL-SCNC: 22 MMOL/L
CREAT SERPL-MCNC: 1.03 MG/DL
EOSINOPHIL # BLD AUTO: 0.2 K/UL
EOSINOPHIL NFR BLD AUTO: 3.7 %
ESTIMATED AVERAGE GLUCOSE: 120 MG/DL
GLUCOSE SERPL-MCNC: 98 MG/DL
HBA1C MFR BLD HPLC: 5.8 %
HCT VFR BLD CALC: 44.9 %
HDLC SERPL-MCNC: 38 MG/DL
HGB BLD-MCNC: 14.3 G/DL
IMM GRANULOCYTES NFR BLD AUTO: 0.4 %
LDLC SERPL CALC-MCNC: 76 MG/DL
LYMPHOCYTES # BLD AUTO: 2.14 K/UL
LYMPHOCYTES NFR BLD AUTO: 39.7 %
MAN DIFF?: NORMAL
MCHC RBC-ENTMCNC: 30.2 PG
MCHC RBC-ENTMCNC: 31.8 GM/DL
MCV RBC AUTO: 94.7 FL
MONOCYTES # BLD AUTO: 0.45 K/UL
MONOCYTES NFR BLD AUTO: 8.3 %
NEUTROPHILS # BLD AUTO: 2.54 K/UL
NEUTROPHILS NFR BLD AUTO: 47.2 %
NONHDLC SERPL-MCNC: 116 MG/DL
PLATELET # BLD AUTO: 234 K/UL
POTASSIUM SERPL-SCNC: 4.7 MMOL/L
PROT SERPL-MCNC: 6.8 G/DL
RBC # BLD: 4.74 M/UL
RBC # FLD: 12.4 %
SODIUM SERPL-SCNC: 143 MMOL/L
TRIGL SERPL-MCNC: 200 MG/DL
TSH SERPL-ACNC: 2.76 UIU/ML
WBC # FLD AUTO: 5.39 K/UL

## 2020-11-06 PROCEDURE — 99215 OFFICE O/P EST HI 40 MIN: CPT | Mod: 25

## 2020-11-06 PROCEDURE — 99173 VISUAL ACUITY SCREEN: CPT | Mod: 59

## 2020-11-06 NOTE — ASSESSMENT
[FreeTextEntry1] : 1.health maintenance: Follow up with GYN regarding repeat mammogram and cologuard. Up-to-date with vaccinations.\par Reviewed recent blood work today.\par \par 2.hyperlipidemia with hypertriglyceridemia: Continue on Crestor 30 mg and fenofibrate.\par \par 3.vitamin D deficiency: Continue on vitamin D 5000 units daily.\par \par 4.COPD with asthma: Albuterol inhaler refilled today.\par \par 5. anxiety disorder: Xanax 0.5 mg refilled today.\par \par 6 allergic conjunctivitis: Follow up with ophthalmology

## 2020-11-06 NOTE — HISTORY OF PRESENT ILLNESS
[FreeTextEntry1] : Patient comes in for an annual physical exam.\par  [de-identified] : Patient is a 61-year-old female history of breast lesion, COPD, hyperlipidemia, hypertension comes in for annual exam. Due to the pandemic she's gained over 17 pounds. She states this is due to lack of diet and exercise. She has had more anxiety recently and does need a refill of her Xanax.\par She scheduled to follow up with her gynecologist soon has not made an appointment yet. She does need repeat mammogram done for her history of right breast lesion.\par We did review her blood work results today in the office.\par Patient is also due for her ophthalmologic will examine she does have a history of dry eyes which worsened with recent seasonal allergies. She is use of over-the-counter drops without much relief.\par Patient denies any cp, sob,abdominal pain, nausea, vomiting, palpitations, fever, chills, constipation, diarrhea.\par

## 2020-11-06 NOTE — PHYSICAL EXAM
[Normal] : Visual acuity was normal [20/___] : left eye 20/[unfilled] [Snellen] : acuity screening with Snellen chart

## 2020-11-06 NOTE — HEALTH RISK ASSESSMENT
[No] : In the past 12 months have you used drugs other than those required for medical reasons? No [1] : 2) Feeling down, depressed, or hopeless for several days (1) [Smoke Detector] : smoke detector [Safety elements used in home] : safety elements used in home [Carbon Monoxide Detector] : carbon monoxide detector [Seat Belt] :  uses seat belt [Sunscreen] : uses sunscreen [Patient reported mammogram was abnormal] : Patient reported mammogram was abnormal [Patient reported PAP Smear was normal] : Patient reported PAP Smear was normal [] :  [Fully functional (bathing, dressing, toileting, transferring, walking, feeding)] : Fully functional (bathing, dressing, toileting, transferring, walking, feeding) [Fully functional (using the telephone, shopping, preparing meals, housekeeping, doing laundry, using] : Fully functional and needs no help or supervision to perform IADLs (using the telephone, shopping, preparing meals, housekeeping, doing laundry, using transportation, managing medications and managing finances) [] : No [[id number]] : 2 [Guns at Home] : no guns at home [MammogramDate] : 2019 [PapSmearDate] : 2019 [ColonoscopyDate] : 2020 [ColonoscopyComments] : cologuard pending

## 2020-11-06 NOTE — ASSESSMENT
[FreeTextEntry1] : 1.anxiety disorder: Xanax 0.5 mg refill today, discussed with the patient for further refills she needs appointments every 90 days.\par \par 2.hypertension: Well controlled today, continue on low sodium diet.\par \par 3.hyperlipidemia with hypertriglyceridemia: Recent lipids doing well on Crestor 30 mg daily with fenofibrate 145 mg daily.\par \par 4.prediabetes: Hemoglobin A1c still at 5.8, discussed diabetic low-carb diet.\par \par 5.sinusitis: Given prescription for prednisone and Augmentin on hand, encourage virtual visit.\par \par 6.health maintenance: DMV forms filled out today with Snellen eye exam bilateral score of 20/40. Encouraged formal optometry examination.\par \par 7.COPD with asthma: Stable at this time, inhalers refilled.

## 2020-11-09 ENCOUNTER — NON-APPOINTMENT (OUTPATIENT)
Age: 70
End: 2020-11-09

## 2021-02-09 ENCOUNTER — APPOINTMENT (OUTPATIENT)
Dept: INTERNAL MEDICINE | Facility: CLINIC | Age: 71
End: 2021-02-09

## 2021-06-18 ENCOUNTER — LABORATORY RESULT (OUTPATIENT)
Age: 71
End: 2021-06-18

## 2021-06-22 ENCOUNTER — APPOINTMENT (OUTPATIENT)
Dept: INTERNAL MEDICINE | Facility: CLINIC | Age: 71
End: 2021-06-22
Payer: MEDICARE

## 2021-06-22 ENCOUNTER — NON-APPOINTMENT (OUTPATIENT)
Age: 71
End: 2021-06-22

## 2021-06-22 VITALS
BODY MASS INDEX: 32.44 KG/M2 | SYSTOLIC BLOOD PRESSURE: 152 MMHG | HEIGHT: 64 IN | DIASTOLIC BLOOD PRESSURE: 84 MMHG | TEMPERATURE: 98.6 F | OXYGEN SATURATION: 98 % | HEART RATE: 83 BPM | WEIGHT: 190 LBS | RESPIRATION RATE: 16 BRPM

## 2021-06-22 DIAGNOSIS — E78.1 PURE HYPERGLYCERIDEMIA: ICD-10-CM

## 2021-06-22 DIAGNOSIS — H26.9 UNSPECIFIED CATARACT: ICD-10-CM

## 2021-06-22 LAB
25(OH)D3 SERPL-MCNC: 68.8 NG/ML
ALBUMIN SERPL ELPH-MCNC: 4.7 G/DL
ALP BLD-CCNC: 68 U/L
ALT SERPL-CCNC: 18 U/L
ANION GAP SERPL CALC-SCNC: 12 MMOL/L
AST SERPL-CCNC: 24 U/L
BASOPHILS # BLD AUTO: 0.05 K/UL
BASOPHILS NFR BLD AUTO: 1 %
BILIRUB SERPL-MCNC: 0.4 MG/DL
BUN SERPL-MCNC: 22 MG/DL
CALCIUM SERPL-MCNC: 9.8 MG/DL
CHLORIDE SERPL-SCNC: 104 MMOL/L
CHOLEST SERPL-MCNC: 159 MG/DL
CO2 SERPL-SCNC: 23 MMOL/L
CREAT SERPL-MCNC: 1.02 MG/DL
CYTOLOGY CVX/VAG DOC THIN PREP: NORMAL
EOSINOPHIL # BLD AUTO: 0.19 K/UL
EOSINOPHIL NFR BLD AUTO: 3.7 %
ESTIMATED AVERAGE GLUCOSE: 117 MG/DL
GLUCOSE SERPL-MCNC: 100 MG/DL
HBA1C MFR BLD HPLC: 5.7 %
HCT VFR BLD CALC: 44.9 %
HDLC SERPL-MCNC: 41 MG/DL
HGB BLD-MCNC: 14.7 G/DL
IMM GRANULOCYTES NFR BLD AUTO: 0.2 %
LDLC SERPL CALC-MCNC: 73 MG/DL
LYMPHOCYTES # BLD AUTO: 2.12 K/UL
LYMPHOCYTES NFR BLD AUTO: 41.7 %
MAN DIFF?: NORMAL
MCHC RBC-ENTMCNC: 30.8 PG
MCHC RBC-ENTMCNC: 32.7 GM/DL
MCV RBC AUTO: 94.1 FL
MONOCYTES # BLD AUTO: 0.44 K/UL
MONOCYTES NFR BLD AUTO: 8.6 %
NEUTROPHILS # BLD AUTO: 2.28 K/UL
NEUTROPHILS NFR BLD AUTO: 44.8 %
NONHDLC SERPL-MCNC: 118 MG/DL
PLATELET # BLD AUTO: 238 K/UL
POTASSIUM SERPL-SCNC: 4.4 MMOL/L
PROT SERPL-MCNC: 7.1 G/DL
RBC # BLD: 4.77 M/UL
RBC # FLD: 12.2 %
SODIUM SERPL-SCNC: 139 MMOL/L
TRIGL SERPL-MCNC: 227 MG/DL
TSH SERPL-ACNC: 4.73 UIU/ML
WBC # FLD AUTO: 5.09 K/UL

## 2021-06-22 PROCEDURE — 99213 OFFICE O/P EST LOW 20 MIN: CPT

## 2021-06-22 PROCEDURE — 99397 PER PM REEVAL EST PAT 65+ YR: CPT

## 2021-06-22 RX ORDER — CLOTRIMAZOLE 10 MG/1
10 LOZENGE ORAL DAILY
Qty: 50 | Refills: 0 | Status: DISCONTINUED | COMMUNITY
Start: 2019-11-06 | End: 2021-06-22

## 2021-06-22 RX ORDER — METHYLPREDNISOLONE 4 MG/1
4 TABLET ORAL
Qty: 1 | Refills: 0 | Status: DISCONTINUED | COMMUNITY
Start: 2020-11-06 | End: 2021-06-22

## 2021-06-22 NOTE — PLAN
[FreeTextEntry1] : Patient's blood work shows elevated TSH with symptoms of weight gain and constipation. However weak and could be due to lack of activity and dietary choices and patient states constipation is chronic for many years. Discussed rechecking TFTs in 2 months.

## 2021-06-22 NOTE — RESULTS/DATA
[] : results reviewed [de-identified] : EKG: NSR at 80 bpm, nonspecific ST-T changes, unchanged from previous ECG.\par

## 2021-06-22 NOTE — ASSESSMENT
[Patient Optimized for Surgery] : Patient optimized for surgery [FreeTextEntry4] : Patient is moderate risk for a low-risk procedure.\par Patient advised to hold aspirin, all NSAIDs including Motrin, naproxen, Aleve, ibuprofen, Advil and fish oil 7 days prior to surgery.\par \par Adequate oxygen monitoring. DVT prophylaxis.\par Continue current medications as directed.\par Covid 19 nasopharyngeal swab per Surgery.\par

## 2021-06-22 NOTE — HISTORY OF PRESENT ILLNESS
[No Adverse Anesthesia Reaction] : no adverse anesthesia reaction in self or family member [(Patient denies any chest pain, claudication, dyspnea on exertion, orthopnea, palpitations or syncope)] : Patient denies any chest pain, claudication, dyspnea on exertion, orthopnea, palpitations or syncope [FreeTextEntry1] : right eye cataract repair [FreeTextEntry2] : 6/29/21 [FreeTextEntry3] : Dr.Paul Thapa [FreeTextEntry4] : Ms. RUY GUAMAN is a 70 year F who comes in for a preoperative evaluation.\par Patient is having bilateral cataract surgery done. She is first having right eye cataract repair done on June 29th and left eye cataract repair on July 13.\par \par She notes a 20 pound weight gain due to lack of activity and uncontrolled diet. Review all of her blood work today which showed stable result with elevated triglycerides. She has been compliant with her medications. She has not had any recent asthma exacerbations.\par She did see GYN recently and had a mammogram and Pap smear done as well as a bone density.\par Patient denies any cp, sob,abdominal pain, nausea, vomiting, palpitations, fever, chills, constipation, diarrhea.\par Anesthesia History: Ms. RUY GUAMAN has had no adverse effects to anesthesia in the past. \par \par Functional Capacity-Walking 1-2 blocks or climbing stairs symptoms: Ms. RUY GUAMAN denies any symptoms of chest pain, CASTELLANOS, SOB or palpitations.\par

## 2021-08-31 ENCOUNTER — APPOINTMENT (OUTPATIENT)
Dept: INTERNAL MEDICINE | Facility: CLINIC | Age: 71
End: 2021-08-31
Payer: MEDICARE

## 2021-08-31 DIAGNOSIS — Z87.898 PERSONAL HISTORY OF OTHER SPECIFIED CONDITIONS: ICD-10-CM

## 2021-08-31 DIAGNOSIS — Z87.09 PERSONAL HISTORY OF OTHER DISEASES OF THE RESPIRATORY SYSTEM: ICD-10-CM

## 2021-08-31 DIAGNOSIS — R05 COUGH: ICD-10-CM

## 2021-08-31 PROCEDURE — 99441: CPT

## 2021-08-31 RX ORDER — MULTIVITAMIN
TABLET ORAL
Refills: 0 | Status: COMPLETED | COMMUNITY
End: 2021-08-31

## 2021-10-05 ENCOUNTER — RX RENEWAL (OUTPATIENT)
Age: 71
End: 2021-10-05

## 2021-11-03 ENCOUNTER — RX RENEWAL (OUTPATIENT)
Age: 71
End: 2021-11-03

## 2021-12-14 ENCOUNTER — APPOINTMENT (OUTPATIENT)
Dept: INTERNAL MEDICINE | Facility: CLINIC | Age: 71
End: 2021-12-14
Payer: MEDICARE

## 2021-12-14 VITALS
HEART RATE: 86 BPM | TEMPERATURE: 98.7 F | DIASTOLIC BLOOD PRESSURE: 80 MMHG | OXYGEN SATURATION: 97 % | SYSTOLIC BLOOD PRESSURE: 120 MMHG | HEIGHT: 64 IN

## 2021-12-14 DIAGNOSIS — K21.9 GASTRO-ESOPHAGEAL REFLUX DISEASE W/OUT ESOPHAGITIS: ICD-10-CM

## 2021-12-14 DIAGNOSIS — R79.89 OTHER SPECIFIED ABNORMAL FINDINGS OF BLOOD CHEMISTRY: ICD-10-CM

## 2021-12-14 PROCEDURE — 99214 OFFICE O/P EST MOD 30 MIN: CPT

## 2021-12-14 NOTE — ASSESSMENT
[FreeTextEntry1] : 1.anxiety disorder: Does not wish to see mental health at this time. Xanax 0.5 mg refilled today.\par \par 2.throat sensation: Advised starting on Pepcid 20 mg at bedtime and omeprazole 20 mg every other day. Advise if symptoms do not improve she does need follow up with GI or ENT.\par \par 3.history of COPD with asthma: She request refill of the amoxicillin and prednisone to keep on hand in case of exacerbation and she does not feel comfortable coming to the doctor's due to pandemic. Continue on albuterol as needed.\par \par 4.history of leg edema: She would like to go back on furosemide 20 mg as needed.\par \par 5.hyperlipidemia: Continue on Crestor 30 mg daily and fenofibrate 145 mg daily, check lipid panel today.\par Patient advised on low cholesterol diet-decrease in white carbs and exercise 150 minutes per week.\par

## 2021-12-14 NOTE — HISTORY OF PRESENT ILLNESS
[FreeTextEntry1] : FU [de-identified] : RUY GUAMAN is a 71 year F who comes in for a follow up visit.\par Patient with history of anxiety disorder and has claustrophobia especially when coming to the doctor. She takes Xanax for p.r.n. relief of her anxiety. She lives with her younger brother at home and does not keep in touch with family.\par Pt notes sensation in her throat for x 2 weeks, "hairy like, tickling sensation." She denies any sore throat, dysphagia, odynophagia or any other symptoms.\par She had cataract repair that went well in the summer. Continues to follow up with optho. \par She gargles with peroxide due to white film on tongue. \par Patient denies any cp, sob,abdominal pain, nausea, vomiting, palpitations, fever, chills, constipation, diarrhea.\par

## 2021-12-17 ENCOUNTER — NON-APPOINTMENT (OUTPATIENT)
Age: 71
End: 2021-12-17

## 2021-12-17 DIAGNOSIS — E83.52 HYPERCALCEMIA: ICD-10-CM

## 2021-12-17 LAB
25(OH)D3 SERPL-MCNC: 72.8 NG/ML
ALBUMIN SERPL ELPH-MCNC: 4.9 G/DL
ALP BLD-CCNC: 70 U/L
ALT SERPL-CCNC: 22 U/L
ANION GAP SERPL CALC-SCNC: 13 MMOL/L
APPEARANCE: ABNORMAL
AST SERPL-CCNC: 23 U/L
BACTERIA: NEGATIVE
BASOPHILS # BLD AUTO: 0.06 K/UL
BASOPHILS NFR BLD AUTO: 1.2 %
BILIRUB SERPL-MCNC: 0.3 MG/DL
BILIRUBIN URINE: NEGATIVE
BLOOD URINE: NEGATIVE
BUN SERPL-MCNC: 27 MG/DL
CALCIUM SERPL-MCNC: 10.1 MG/DL
CHLORIDE SERPL-SCNC: 102 MMOL/L
CHOLEST SERPL-MCNC: 169 MG/DL
CO2 SERPL-SCNC: 24 MMOL/L
COLOR: YELLOW
CREAT SERPL-MCNC: 1.11 MG/DL
EOSINOPHIL # BLD AUTO: 0.17 K/UL
EOSINOPHIL NFR BLD AUTO: 3.4 %
ESTIMATED AVERAGE GLUCOSE: 120 MG/DL
GLUCOSE QUALITATIVE U: NEGATIVE
GLUCOSE SERPL-MCNC: 111 MG/DL
HBA1C MFR BLD HPLC: 5.8 %
HCT VFR BLD CALC: 47.2 %
HDLC SERPL-MCNC: 41 MG/DL
HGB BLD-MCNC: 14.5 G/DL
HYALINE CASTS: 4 /LPF
IMM GRANULOCYTES NFR BLD AUTO: 0.2 %
KETONES URINE: NEGATIVE
LDLC SERPL CALC-MCNC: 90 MG/DL
LEUKOCYTE ESTERASE URINE: ABNORMAL
LYMPHOCYTES # BLD AUTO: 1.77 K/UL
LYMPHOCYTES NFR BLD AUTO: 35.6 %
MAN DIFF?: NORMAL
MCHC RBC-ENTMCNC: 30.3 PG
MCHC RBC-ENTMCNC: 30.7 GM/DL
MCV RBC AUTO: 98.7 FL
MICROSCOPIC-UA: NORMAL
MONOCYTES # BLD AUTO: 0.48 K/UL
MONOCYTES NFR BLD AUTO: 9.7 %
NEUTROPHILS # BLD AUTO: 2.48 K/UL
NEUTROPHILS NFR BLD AUTO: 49.9 %
NITRITE URINE: NEGATIVE
NONHDLC SERPL-MCNC: 128 MG/DL
PH URINE: 5.5
PLATELET # BLD AUTO: 236 K/UL
POTASSIUM SERPL-SCNC: 5.4 MMOL/L
PROT SERPL-MCNC: 7 G/DL
PROTEIN URINE: NORMAL
RBC # BLD: 4.78 M/UL
RBC # FLD: 12.5 %
RED BLOOD CELLS URINE: 4 /HPF
SODIUM SERPL-SCNC: 139 MMOL/L
SPECIFIC GRAVITY URINE: 1.03
SQUAMOUS EPITHELIAL CELLS: 15 /HPF
T4 FREE SERPL-MCNC: 1.2 NG/DL
TRIGL SERPL-MCNC: 186 MG/DL
TSH SERPL-ACNC: 2.23 UIU/ML
UROBILINOGEN URINE: NORMAL
VIT B12 SERPL-MCNC: 828 PG/ML
WBC # FLD AUTO: 4.97 K/UL
WHITE BLOOD CELLS URINE: 17 /HPF

## 2021-12-27 ENCOUNTER — NON-APPOINTMENT (OUTPATIENT)
Age: 71
End: 2021-12-27

## 2022-01-10 ENCOUNTER — NON-APPOINTMENT (OUTPATIENT)
Age: 72
End: 2022-01-10

## 2022-01-10 LAB
ANION GAP SERPL CALC-SCNC: 14 MMOL/L
BACTERIA UR CULT: NORMAL
BUN SERPL-MCNC: 18 MG/DL
CALCIUM SERPL-MCNC: 10.1 MG/DL
CHLORIDE SERPL-SCNC: 104 MMOL/L
CO2 SERPL-SCNC: 24 MMOL/L
CREAT SERPL-MCNC: 1.06 MG/DL
GLUCOSE SERPL-MCNC: 95 MG/DL
POTASSIUM SERPL-SCNC: 4.7 MMOL/L
SODIUM SERPL-SCNC: 142 MMOL/L

## 2022-05-11 ENCOUNTER — APPOINTMENT (OUTPATIENT)
Dept: INTERNAL MEDICINE | Facility: CLINIC | Age: 72
End: 2022-05-11
Payer: MEDICARE

## 2022-05-11 ENCOUNTER — NON-APPOINTMENT (OUTPATIENT)
Age: 72
End: 2022-05-11

## 2022-05-11 VITALS
WEIGHT: 190 LBS | BODY MASS INDEX: 32.44 KG/M2 | TEMPERATURE: 98.1 F | HEART RATE: 95 BPM | OXYGEN SATURATION: 96 % | DIASTOLIC BLOOD PRESSURE: 74 MMHG | HEIGHT: 64 IN | SYSTOLIC BLOOD PRESSURE: 128 MMHG

## 2022-05-11 PROCEDURE — 93000 ELECTROCARDIOGRAM COMPLETE: CPT | Mod: 59

## 2022-05-11 PROCEDURE — 96372 THER/PROPH/DIAG INJ SC/IM: CPT

## 2022-05-11 PROCEDURE — 99214 OFFICE O/P EST MOD 30 MIN: CPT | Mod: 25

## 2022-05-11 RX ORDER — TRIAMCINOLONE ACETONIDE 40 MG/ML
40 SUSPENSION INTRA-ARTERIAL; INTRAMUSCULAR
Qty: 4 | Refills: 0 | Status: COMPLETED | OUTPATIENT
Start: 2022-05-11

## 2022-05-11 RX ADMIN — TRIAMCINOLONE ACETONIDE 0 MG/ML: 40 INJECTION, SUSPENSION INTRA-ARTICULAR; INTRAMUSCULAR at 00:00

## 2022-05-11 NOTE — HEALTH RISK ASSESSMENT
[1] : 2) Feeling down, depressed, or hopeless for several days (1) [PHQ-2 Positive] : PHQ-2 Positive [PHQ-9 Positive] : PHQ-9 Positive [I have developed a follow-up plan documented below in the note.] : I have developed a follow-up plan documented below in the note. [[id number]] : 2

## 2022-05-11 NOTE — HISTORY OF PRESENT ILLNESS
[FreeTextEntry8] : Ms. RUY GUAMAN is a 71 year F who comes in for an acute visit.\par Patient notes for the last few weeks having increasing exacerbation of her allergies with symptoms of sinus congestion and pain as well as congestion in her lungs.  She denies any cough, fever or chills.  She did a home COVID test last week that was negative.  She has been using her albuterol inhaler multiple times a day as well as her Flonase nasal spray and feels she may have oral thrush again.  She feels very anxious about being in the office and does not wish to go to the hospital.\par Patient denies any cp, sob,abdominal pain, nausea, vomiting, palpitations, fever, chills, constipation, diarrhea.\par

## 2022-05-11 NOTE — ASSESSMENT
[FreeTextEntry1] : 1.COPD with asthma exacerbation: Start on prednisone taper, Kenalog 40 mg IM given in the office today, start on Augmentin as well.  Patient does not wish to go to the emergency room for evaluation at this time and understands the risks.  Advised if symptoms do not improve will need chest x-ray but patient is hesitant to go to any facilities.\par EKG stable. \par \par 2.  Oral thrush: Given prescription for Diflucan and clotrimazole oral. Went over instructions and side effects of medication.\par \par 3.anxiety disorder: Continue on Xanax as needed, counseling provided to the patient.

## 2022-05-12 ENCOUNTER — NON-APPOINTMENT (OUTPATIENT)
Age: 72
End: 2022-05-12

## 2022-05-12 LAB
RAPID RVP RESULT: NOT DETECTED
SARS-COV-2 RNA PNL RESP NAA+PROBE: NOT DETECTED

## 2022-05-19 ENCOUNTER — NON-APPOINTMENT (OUTPATIENT)
Age: 72
End: 2022-05-19

## 2022-06-14 RX ORDER — FUROSEMIDE 20 MG/1
20 TABLET ORAL
Qty: 90 | Refills: 1 | Status: ACTIVE | COMMUNITY
Start: 2017-05-03 | End: 1900-01-01

## 2022-06-17 ENCOUNTER — APPOINTMENT (OUTPATIENT)
Dept: INTERNAL MEDICINE | Facility: CLINIC | Age: 72
End: 2022-06-17

## 2022-07-06 ENCOUNTER — APPOINTMENT (OUTPATIENT)
Dept: INTERNAL MEDICINE | Facility: CLINIC | Age: 72
End: 2022-07-06

## 2022-07-06 VITALS
HEIGHT: 64 IN | DIASTOLIC BLOOD PRESSURE: 82 MMHG | SYSTOLIC BLOOD PRESSURE: 144 MMHG | OXYGEN SATURATION: 98 % | TEMPERATURE: 98.9 F | HEART RATE: 83 BPM

## 2022-07-06 VITALS — DIASTOLIC BLOOD PRESSURE: 80 MMHG | SYSTOLIC BLOOD PRESSURE: 140 MMHG

## 2022-07-06 DIAGNOSIS — Z87.891 PERSONAL HISTORY OF NICOTINE DEPENDENCE: ICD-10-CM

## 2022-07-06 DIAGNOSIS — F41.9 ANXIETY DISORDER, UNSPECIFIED: ICD-10-CM

## 2022-07-06 LAB
25(OH)D3 SERPL-MCNC: 68.9 NG/ML
ALBUMIN SERPL ELPH-MCNC: 4.4 G/DL
ALP BLD-CCNC: 62 U/L
ALT SERPL-CCNC: 21 U/L
ANION GAP SERPL CALC-SCNC: 12 MMOL/L
AST SERPL-CCNC: 23 U/L
BASOPHILS # BLD AUTO: 0.05 K/UL
BASOPHILS NFR BLD AUTO: 0.9 %
BILIRUB SERPL-MCNC: 0.4 MG/DL
BUN SERPL-MCNC: 22 MG/DL
CALCIUM SERPL-MCNC: 9.7 MG/DL
CHLORIDE SERPL-SCNC: 104 MMOL/L
CHOLEST SERPL-MCNC: 165 MG/DL
CO2 SERPL-SCNC: 25 MMOL/L
CREAT SERPL-MCNC: 1.01 MG/DL
EGFR: 60 ML/MIN/1.73M2
EOSINOPHIL # BLD AUTO: 0.13 K/UL
EOSINOPHIL NFR BLD AUTO: 2.3 %
ESTIMATED AVERAGE GLUCOSE: 123 MG/DL
GLUCOSE SERPL-MCNC: 99 MG/DL
HBA1C MFR BLD HPLC: 5.9 %
HCT VFR BLD CALC: 45.8 %
HDLC SERPL-MCNC: 46 MG/DL
HGB BLD-MCNC: 15.1 G/DL
IMM GRANULOCYTES NFR BLD AUTO: 0.7 %
LDLC SERPL CALC-MCNC: 84 MG/DL
LYMPHOCYTES # BLD AUTO: 1.97 K/UL
LYMPHOCYTES NFR BLD AUTO: 34.4 %
MAN DIFF?: NORMAL
MCHC RBC-ENTMCNC: 31.1 PG
MCHC RBC-ENTMCNC: 33 GM/DL
MCV RBC AUTO: 94.4 FL
MONOCYTES # BLD AUTO: 0.51 K/UL
MONOCYTES NFR BLD AUTO: 8.9 %
NEUTROPHILS # BLD AUTO: 3.02 K/UL
NEUTROPHILS NFR BLD AUTO: 52.8 %
NONHDLC SERPL-MCNC: 119 MG/DL
PLATELET # BLD AUTO: 245 K/UL
POTASSIUM SERPL-SCNC: 5.4 MMOL/L
PROT SERPL-MCNC: 6.5 G/DL
RBC # BLD: 4.85 M/UL
RBC # FLD: 13 %
SODIUM SERPL-SCNC: 141 MMOL/L
TRIGL SERPL-MCNC: 175 MG/DL
TSH SERPL-ACNC: 2.11 UIU/ML
VIT B12 SERPL-MCNC: 781 PG/ML
WBC # FLD AUTO: 5.72 K/UL

## 2022-07-06 PROCEDURE — 99214 OFFICE O/P EST MOD 30 MIN: CPT

## 2022-07-06 RX ORDER — FLUCONAZOLE 100 MG/1
100 TABLET ORAL DAILY
Qty: 7 | Refills: 0 | Status: DISCONTINUED | COMMUNITY
Start: 2022-05-19 | End: 2022-07-06

## 2022-07-07 NOTE — HISTORY OF PRESENT ILLNESS
[FreeTextEntry1] : FU [de-identified] : RUY GUAMAN is a 71 year F who comes in for a follow up visit.\par Pt notes about a month her brother who she lives with her had URI last month and loss taste, but tested negative for covid. She got same symptoms but is now feeling better. \par We did review all blood work today. Potassium mildly elevated at 5.4. \par Patient denies any cp, sob,abdominal pain, nausea, vomiting, palpitations, fever, chills, constipation, diarrhea.\par

## 2022-07-07 NOTE — ASSESSMENT
[FreeTextEntry1] : 1.health maintenance: All recent blood work reviewed, repeat BMP due to mildly elevated potassium and check urinalysis and 2 to 3 days.  Patient will take extra dose of furosemide.  Obtain records from mammogram done in April by GYN.\par \par 2.COPD with asthma: Recent flare noted and resolved at this time.\par \par 3.check UA and BMP at this time.\par \par

## 2022-07-14 ENCOUNTER — NON-APPOINTMENT (OUTPATIENT)
Age: 72
End: 2022-07-14

## 2022-07-14 LAB
ANION GAP SERPL CALC-SCNC: 16 MMOL/L
APPEARANCE: CLEAR
BACTERIA: NEGATIVE
BILIRUBIN URINE: NEGATIVE
BLOOD URINE: NEGATIVE
BUN SERPL-MCNC: 20 MG/DL
CALCIUM SERPL-MCNC: 9.9 MG/DL
CHLORIDE SERPL-SCNC: 103 MMOL/L
CO2 SERPL-SCNC: 23 MMOL/L
COLOR: NORMAL
CREAT SERPL-MCNC: 1.03 MG/DL
EGFR: 58 ML/MIN/1.73M2
GLUCOSE QUALITATIVE U: NEGATIVE
GLUCOSE SERPL-MCNC: 103 MG/DL
HYALINE CASTS: 2 /LPF
KETONES URINE: NEGATIVE
LEUKOCYTE ESTERASE URINE: ABNORMAL
MICROSCOPIC-UA: NORMAL
NITRITE URINE: NEGATIVE
PH URINE: 6.5
POTASSIUM SERPL-SCNC: 4.5 MMOL/L
PROTEIN URINE: NORMAL
RED BLOOD CELLS URINE: 2 /HPF
SODIUM SERPL-SCNC: 141 MMOL/L
SPECIFIC GRAVITY URINE: 1.02
SQUAMOUS EPITHELIAL CELLS: 3 /HPF
UROBILINOGEN URINE: NORMAL
WHITE BLOOD CELLS URINE: 1 /HPF

## 2022-07-22 ENCOUNTER — EMERGENCY (EMERGENCY)
Facility: HOSPITAL | Age: 72
LOS: 0 days | Discharge: HOME | End: 2022-07-22
Attending: EMERGENCY MEDICINE
Payer: MEDICARE

## 2022-07-22 VITALS
RESPIRATION RATE: 18 BRPM | TEMPERATURE: 99 F | HEART RATE: 75 BPM | OXYGEN SATURATION: 97 % | SYSTOLIC BLOOD PRESSURE: 165 MMHG | DIASTOLIC BLOOD PRESSURE: 99 MMHG

## 2022-07-22 VITALS
TEMPERATURE: 98 F | HEIGHT: 72 IN | RESPIRATION RATE: 18 BRPM | HEART RATE: 82 BPM | SYSTOLIC BLOOD PRESSURE: 187 MMHG | OXYGEN SATURATION: 95 % | DIASTOLIC BLOOD PRESSURE: 86 MMHG

## 2022-07-22 DIAGNOSIS — Z87.19 PERSONAL HISTORY OF OTHER DISEASES OF THE DIGESTIVE SYSTEM: Chronic | ICD-10-CM

## 2022-07-22 DIAGNOSIS — J45.901 UNSPECIFIED ASTHMA WITH (ACUTE) EXACERBATION: ICD-10-CM

## 2022-07-22 DIAGNOSIS — Z98.890 OTHER SPECIFIED POSTPROCEDURAL STATES: Chronic | ICD-10-CM

## 2022-07-22 DIAGNOSIS — R06.02 SHORTNESS OF BREATH: ICD-10-CM

## 2022-07-22 DIAGNOSIS — R10.9 UNSPECIFIED ABDOMINAL PAIN: ICD-10-CM

## 2022-07-22 DIAGNOSIS — Z86.018 PERSONAL HISTORY OF OTHER BENIGN NEOPLASM: Chronic | ICD-10-CM

## 2022-07-22 DIAGNOSIS — D64.9 ANEMIA, UNSPECIFIED: ICD-10-CM

## 2022-07-22 DIAGNOSIS — E78.5 HYPERLIPIDEMIA, UNSPECIFIED: ICD-10-CM

## 2022-07-22 DIAGNOSIS — Z88.0 ALLERGY STATUS TO PENICILLIN: ICD-10-CM

## 2022-07-22 DIAGNOSIS — R19.7 DIARRHEA, UNSPECIFIED: ICD-10-CM

## 2022-07-22 DIAGNOSIS — K21.9 GASTRO-ESOPHAGEAL REFLUX DISEASE WITHOUT ESOPHAGITIS: ICD-10-CM

## 2022-07-22 DIAGNOSIS — Z88.5 ALLERGY STATUS TO NARCOTIC AGENT: ICD-10-CM

## 2022-07-22 DIAGNOSIS — J44.1 CHRONIC OBSTRUCTIVE PULMONARY DISEASE WITH (ACUTE) EXACERBATION: ICD-10-CM

## 2022-07-22 DIAGNOSIS — K46.9 UNSPECIFIED ABDOMINAL HERNIA WITHOUT OBSTRUCTION OR GANGRENE: ICD-10-CM

## 2022-07-22 DIAGNOSIS — U07.1 COVID-19: ICD-10-CM

## 2022-07-22 DIAGNOSIS — Z88.1 ALLERGY STATUS TO OTHER ANTIBIOTIC AGENTS STATUS: ICD-10-CM

## 2022-07-22 DIAGNOSIS — R03.0 ELEVATED BLOOD-PRESSURE READING, WITHOUT DIAGNOSIS OF HYPERTENSION: ICD-10-CM

## 2022-07-22 LAB
ADD ON TEST-SPECIMEN IN LAB: SIGNIFICANT CHANGE UP
ADD ON TEST-SPECIMEN IN LAB: SIGNIFICANT CHANGE UP
ALBUMIN SERPL ELPH-MCNC: 3.8 G/DL — SIGNIFICANT CHANGE UP (ref 3.3–5)
ALP SERPL-CCNC: 67 U/L — SIGNIFICANT CHANGE UP (ref 40–120)
ALT FLD-CCNC: 32 U/L — SIGNIFICANT CHANGE UP (ref 12–78)
ANION GAP SERPL CALC-SCNC: 7 MMOL/L — SIGNIFICANT CHANGE UP (ref 5–17)
APPEARANCE UR: CLEAR — SIGNIFICANT CHANGE UP
APTT BLD: 28.3 SEC — SIGNIFICANT CHANGE UP (ref 27.5–35.5)
AST SERPL-CCNC: 21 U/L — SIGNIFICANT CHANGE UP (ref 15–37)
BASOPHILS # BLD AUTO: 0.01 K/UL — SIGNIFICANT CHANGE UP (ref 0–0.2)
BASOPHILS NFR BLD AUTO: 0.1 % — SIGNIFICANT CHANGE UP (ref 0–2)
BILIRUB SERPL-MCNC: 0.4 MG/DL — SIGNIFICANT CHANGE UP (ref 0.2–1.2)
BILIRUB UR-MCNC: NEGATIVE — SIGNIFICANT CHANGE UP
BUN SERPL-MCNC: 20 MG/DL — SIGNIFICANT CHANGE UP (ref 7–23)
CALCIUM SERPL-MCNC: 9.5 MG/DL — SIGNIFICANT CHANGE UP (ref 8.5–10.1)
CHLORIDE SERPL-SCNC: 104 MMOL/L — SIGNIFICANT CHANGE UP (ref 96–108)
CO2 SERPL-SCNC: 26 MMOL/L — SIGNIFICANT CHANGE UP (ref 22–31)
COLOR SPEC: YELLOW — SIGNIFICANT CHANGE UP
CREAT SERPL-MCNC: 1.08 MG/DL — SIGNIFICANT CHANGE UP (ref 0.5–1.3)
DIFF PNL FLD: NEGATIVE — SIGNIFICANT CHANGE UP
EGFR: 55 ML/MIN/1.73M2 — LOW
EOSINOPHIL # BLD AUTO: 0 K/UL — SIGNIFICANT CHANGE UP (ref 0–0.5)
EOSINOPHIL NFR BLD AUTO: 0 % — SIGNIFICANT CHANGE UP (ref 0–6)
GLUCOSE SERPL-MCNC: 113 MG/DL — HIGH (ref 70–99)
GLUCOSE UR QL: NEGATIVE — SIGNIFICANT CHANGE UP
HCT VFR BLD CALC: 43.1 % — SIGNIFICANT CHANGE UP (ref 34.5–45)
HGB BLD-MCNC: 14.4 G/DL — SIGNIFICANT CHANGE UP (ref 11.5–15.5)
IMM GRANULOCYTES NFR BLD AUTO: 1.1 % — SIGNIFICANT CHANGE UP (ref 0–1.5)
INR BLD: 0.97 RATIO — SIGNIFICANT CHANGE UP (ref 0.88–1.16)
KETONES UR-MCNC: NEGATIVE — SIGNIFICANT CHANGE UP
LEUKOCYTE ESTERASE UR-ACNC: NEGATIVE — SIGNIFICANT CHANGE UP
LYMPHOCYTES # BLD AUTO: 1.24 K/UL — SIGNIFICANT CHANGE UP (ref 1–3.3)
LYMPHOCYTES # BLD AUTO: 16.8 % — SIGNIFICANT CHANGE UP (ref 13–44)
MCHC RBC-ENTMCNC: 30.5 PG — SIGNIFICANT CHANGE UP (ref 27–34)
MCHC RBC-ENTMCNC: 33.4 GM/DL — SIGNIFICANT CHANGE UP (ref 32–36)
MCV RBC AUTO: 91.3 FL — SIGNIFICANT CHANGE UP (ref 80–100)
MONOCYTES # BLD AUTO: 0.58 K/UL — SIGNIFICANT CHANGE UP (ref 0–0.9)
MONOCYTES NFR BLD AUTO: 7.8 % — SIGNIFICANT CHANGE UP (ref 2–14)
NEUTROPHILS # BLD AUTO: 5.49 K/UL — SIGNIFICANT CHANGE UP (ref 1.8–7.4)
NEUTROPHILS NFR BLD AUTO: 74.2 % — SIGNIFICANT CHANGE UP (ref 43–77)
NITRITE UR-MCNC: NEGATIVE — SIGNIFICANT CHANGE UP
PH UR: 7 — SIGNIFICANT CHANGE UP (ref 5–8)
PLATELET # BLD AUTO: 251 K/UL — SIGNIFICANT CHANGE UP (ref 150–400)
POTASSIUM SERPL-MCNC: 3.6 MMOL/L — SIGNIFICANT CHANGE UP (ref 3.5–5.3)
POTASSIUM SERPL-SCNC: 3.6 MMOL/L — SIGNIFICANT CHANGE UP (ref 3.5–5.3)
PROT SERPL-MCNC: 7.2 GM/DL — SIGNIFICANT CHANGE UP (ref 6–8.3)
PROT UR-MCNC: NEGATIVE — SIGNIFICANT CHANGE UP
PROTHROM AB SERPL-ACNC: 11.2 SEC — SIGNIFICANT CHANGE UP (ref 10.5–13.4)
RBC # BLD: 4.72 M/UL — SIGNIFICANT CHANGE UP (ref 3.8–5.2)
RBC # FLD: 12.8 % — SIGNIFICANT CHANGE UP (ref 10.3–14.5)
SODIUM SERPL-SCNC: 137 MMOL/L — SIGNIFICANT CHANGE UP (ref 135–145)
SP GR SPEC: 1 — LOW (ref 1.01–1.02)
UROBILINOGEN FLD QL: NEGATIVE — SIGNIFICANT CHANGE UP
WBC # BLD: 7.4 K/UL — SIGNIFICANT CHANGE UP (ref 3.8–10.5)
WBC # FLD AUTO: 7.4 K/UL — SIGNIFICANT CHANGE UP (ref 3.8–10.5)

## 2022-07-22 PROCEDURE — 0225U NFCT DS DNA&RNA 21 SARSCOV2: CPT

## 2022-07-22 PROCEDURE — 71045 X-RAY EXAM CHEST 1 VIEW: CPT

## 2022-07-22 PROCEDURE — 83690 ASSAY OF LIPASE: CPT

## 2022-07-22 PROCEDURE — 74177 CT ABD & PELVIS W/CONTRAST: CPT | Mod: 26,ME

## 2022-07-22 PROCEDURE — 36415 COLL VENOUS BLD VENIPUNCTURE: CPT

## 2022-07-22 PROCEDURE — 93010 ELECTROCARDIOGRAM REPORT: CPT

## 2022-07-22 PROCEDURE — G1004: CPT

## 2022-07-22 PROCEDURE — 87186 SC STD MICRODIL/AGAR DIL: CPT

## 2022-07-22 PROCEDURE — 81003 URINALYSIS AUTO W/O SCOPE: CPT

## 2022-07-22 PROCEDURE — 80053 COMPREHEN METABOLIC PANEL: CPT

## 2022-07-22 PROCEDURE — 99285 EMERGENCY DEPT VISIT HI MDM: CPT

## 2022-07-22 PROCEDURE — 93005 ELECTROCARDIOGRAM TRACING: CPT

## 2022-07-22 PROCEDURE — 84484 ASSAY OF TROPONIN QUANT: CPT

## 2022-07-22 PROCEDURE — 85610 PROTHROMBIN TIME: CPT

## 2022-07-22 PROCEDURE — 96375 TX/PRO/DX INJ NEW DRUG ADDON: CPT

## 2022-07-22 PROCEDURE — 74177 CT ABD & PELVIS W/CONTRAST: CPT | Mod: ME

## 2022-07-22 PROCEDURE — 87086 URINE CULTURE/COLONY COUNT: CPT

## 2022-07-22 PROCEDURE — 85025 COMPLETE CBC W/AUTO DIFF WBC: CPT

## 2022-07-22 PROCEDURE — 71045 X-RAY EXAM CHEST 1 VIEW: CPT | Mod: 26

## 2022-07-22 PROCEDURE — 85730 THROMBOPLASTIN TIME PARTIAL: CPT

## 2022-07-22 PROCEDURE — 96374 THER/PROPH/DIAG INJ IV PUSH: CPT

## 2022-07-22 PROCEDURE — 94640 AIRWAY INHALATION TREATMENT: CPT

## 2022-07-22 PROCEDURE — 87077 CULTURE AEROBIC IDENTIFY: CPT

## 2022-07-22 PROCEDURE — 99285 EMERGENCY DEPT VISIT HI MDM: CPT | Mod: 25

## 2022-07-22 RX ORDER — NIRMATRELVIR AND RITONAVIR 150-100 MG
3 KIT ORAL
Qty: 30 | Refills: 0
Start: 2022-07-22 | End: 2022-07-26

## 2022-07-22 RX ORDER — ALBUTEROL 90 UG/1
2 AEROSOL, METERED ORAL
Qty: 1 | Refills: 0
Start: 2022-07-22

## 2022-07-22 RX ORDER — ALBUTEROL 90 UG/1
2 AEROSOL, METERED ORAL EVERY 4 HOURS
Refills: 0 | Status: DISCONTINUED | OUTPATIENT
Start: 2022-07-22 | End: 2022-07-22

## 2022-07-22 RX ORDER — NIRMATRELVIR AND RITONAVIR 150-100 MG
1 KIT ORAL
Qty: 30 | Refills: 0
Start: 2022-07-22

## 2022-07-22 RX ORDER — ACETAMINOPHEN 500 MG
650 TABLET ORAL ONCE
Refills: 0 | Status: COMPLETED | OUTPATIENT
Start: 2022-07-22 | End: 2022-07-22

## 2022-07-22 RX ORDER — NYSTATIN 500MM UNIT
4 POWDER (EA) MISCELLANEOUS
Qty: 100 | Refills: 0
Start: 2022-07-22

## 2022-07-22 RX ADMIN — Medication 0.5 MILLIGRAM(S): at 21:27

## 2022-07-22 RX ADMIN — Medication 40 MILLIGRAM(S): at 21:27

## 2022-07-22 RX ADMIN — Medication 500 MILLIGRAM(S): at 21:27

## 2022-07-22 RX ADMIN — ALBUTEROL 2 PUFF(S): 90 AEROSOL, METERED ORAL at 23:26

## 2022-07-22 RX ADMIN — Medication 650 MILLIGRAM(S): at 21:27

## 2022-07-22 NOTE — ED PROVIDER NOTE - OBJECTIVE STATEMENT
70 y/o female with a PMHx of GERD, anemia, hernia, asthma, and hyperlipidemia presents to the ED c/o SOB. Pt states for past 2 days since heat has been so overbearing, she has had increased SOB at home. Pt has prescription of Prednisone when COPD exacerbated. Pt states she took 1 dose today. Pt is brought in ED for abdominal pain and distension. Pt states she feels very bloated and had 4 episodes of diarrhea today. Denies fever or urine symptoms. PSHx of cholecystectomy at 37 y/o.

## 2022-07-22 NOTE — ED PROVIDER NOTE - NSICDXPASTMEDICALHX_GEN_ALL_CORE_FT
PAST MEDICAL HISTORY:  Asthma     GERD (gastroesophageal reflux disease)     Hernia     Hyperlipidemia, unspecified hyperlipidemia type

## 2022-07-22 NOTE — ED PROVIDER NOTE - NSFOLLOWUPINSTRUCTIONS_ED_ALL_ED_FT
Return to the Emergency Department for worsening or persistent symptoms, and/or ANY NEW OR CONCERNING SYMPTOMS. If you have issues obtaining follow up, please call: [phone] or [phone]  to obtain a doctor or specialist who takes your insurance in your area.      COVID-19: What to Do if You Are Sick      If you test positive and are an older adult or someone who is at high risk of getting very sick from COVID-19, treatment may be available. Contact a healthcare provider right away after a positive test to determine if you are eligible, even if your symptoms are mild right now. You can also visit a Test to Treat location and, if eligible, receive a prescription from a provider. Don't delay: Treatment must be started within the first few days to be effective.    If you have a fever, cough, or other symptoms, you might have COVID-19. Most people have mild illness and are able to recover at home. If you are sick:  •Keep track of your symptoms.      •If you have an emergency warning sign (including trouble breathing), call 911.        Steps to help prevent the spread of COVID-19 if you are sick    If you are sick with COVID-19 or think you might have COVID-19, follow the steps below to care for yourself and to help protect other people in your home and community.    Stay home except to get medical care     • Stay home. Most people with COVID-19 have mild illness and can recover at home without medical care. Do not leave your home, except to get medical care. Do not visit public areas and do not go to places where you are unable to wear a mask.      • Take care of yourself. Get rest and stay hydrated. Take over-the-counter medicines, such as acetaminophen, to help you feel better.      • Stay in touch with your doctor. Call before you get medical care. Be sure to get care if you have trouble breathing, or have any other emergency warning signs, or if you think it is an emergency.      • Avoid public transportation, ride-sharing, or taxis if possible.      Get tested     •If you have symptoms of COVID-19, get tested. While waiting for test results, stay away from others, including staying apart from those living in your household.      • Get tested as soon as possible after your symptoms start. Treatments may be available for people with COVID-19 who are at risk for becoming very sick. Don't delay: Treatment must be started early to be effective—some treatments must begin within 5 days of your first symptoms. Contact your healthcare provider right away if your test result is positive to determine if you are eligible.      • Self-tests are one of several options for testing for the virus that causes COVID-19 and may be more convenient than laboratory-based tests and point-of-care tests. Ask your healthcare provider or your local health department if you need help interpreting your test results.      •You can visit your [facility], [facility], [facility], and [facility] department's website to look for the latest local information on testing sites.      Separate yourself from other people     As much as possible, stay in a specific room and away from other people and pets in your home. If possible, you should use a separate bathroom. If you need to be around other people or animals in or outside of the home, wear a well-fitting mask.    Tell your close contacts that they may have been exposed to COVID-19. An infected person can spread COVID-19 starting 48 hours (or 2 days) before the person has any symptoms or tests positive. By letting your close contacts know they may have been exposed to COVID-19, you are helping to protect everyone.  •See COVID-19 and Animals if you have questions about pets.      •If you are diagnosed with COVID-19, someone from the health department may call you. Answer the call to slow the spread.      Monitor your symptoms    •Symptoms of COVID-19 include fever, cough, or other symptoms.       •Follow care instructions from your healthcare provider and local health department. Your local health authorities may give instructions on checking your symptoms and reporting information.      When to seek emergency medical attention    Look for emergency warning signs* for COVID-19. If someone is showing any of these signs, seek emergency medical care immediately:  •Trouble breathing      •Persistent pain or pressure in the chest      •New confusion      •Inability to wake or stay awake      •Pale, gray, or blue-colored skin, lips, or nail beds, depending on skin tone      *This list is not all possible symptoms. Please call your medical provider for any other symptoms that are severe or concerning to you.    Call 911 or call ahead to your local emergency facility: Notify the  that you are seeking care for someone who has or may have COVID-19.    Call ahead before visiting your doctor    •Call ahead. Many medical visits for routine care are being postponed or done by phone or telemedicine.      •If you have a medical appointment that cannot be postponed, call your doctor's office, and tell them you have or may have COVID-19. This will help the office protect themselves and other patients.      If you are sick, wear a well-fitting mask    •You should wear a mask if you must be around other people or animals, including pets (even at home).      •Wear a mask with the best fit, protection, and comfort for you.      •You don't need to wear the mask if you are alone. If you can't put on a mask (because of trouble breathing, for example), cover your coughs and sneezes in some other way. Try to stay at least 6 feet away from other people. This will help protect the people around you.      •Masks should not be placed on young children under age 2 years, anyone who has trouble breathing, or anyone who is not able to remove the mask without help.      Cover your coughs and sneezes    •Cover your mouth and nose with a tissue when you cough or sneeze.      •Throw away used tissues in a lined trash can.      •Immediately wash your hands with soap and water for at least 20 seconds. If soap and water are not available, clean your hands with an alcohol-based hand  that contains at least 60% alcohol.      Clean your hands often    •Wash your hands often with soap and water for at least 20 seconds. This is especially important after blowing your nose, coughing, or sneezing; going to the bathroom; and before eating or preparing food.      •Use hand  if soap and water are not available. Use an alcohol-based hand  with at least 60% alcohol, covering all surfaces of your hands and rubbing them together until they feel dry.      •Soap and water are the best option, especially if hands are visibly dirty.       •Avoid touching your eyes, nose, and mouth with unwashed hands.      •Handwashing Tips      Avoid sharing personal household items    •Do not share dishes, drinking glasses, cups, eating utensils, towels, or bedding with other people in your home.      •Wash these items thoroughly after using them with soap and water or put in the .      Clean surfaces in your home regularly    •Clean and disinfect high-touch surfaces (for example, doorknobs, tables, handles, light switches, and countertops) in your "sick room" and bathroom. In shared spaces, you should clean and disinfect surfaces and items after each use by the person who is ill.      •If you are sick and cannot clean, a caregiver or other person should only clean and disinfect the area around you (such as your bedroom and bathroom) on an as needed basis. Your caregiver/other person should wait as long as possible (at least several hours) and wear a mask before entering, cleaning, and disinfecting shared spaces that you use.      •Clean and disinfect areas that may have blood, stool, or body fluids on them.    •Use household  and disinfectants. Clean visible dirty surfaces with household  containing soap or detergent. Then, use a household disinfectant.  •Use a product from EPA's List N: Disinfectants for Coronavirus (COVID-19).      •Be sure to follow the instructions on the label to ensure safe and effective use of the product. Many products recommend keeping the surface wet with a disinfectant for a certain period of time (look at "contact time" on the product label).      •You may also need to wear personal protective equipment, such as gloves, depending on the directions on the product label.      •Immediately after disinfecting, wash your hands with soap and water for 20 seconds.      •For completed guidance on cleaning and disinfecting your home, visit Complete Disinfection Guidance.        Take steps to improve ventilation at home    •Improve ventilation (air flow) at home to help prevent from spreading COVID-19 to other people in your household.      •Clear out COVID-19 virus particles in the air by opening windows, using air filters, and turning on fans in your home.      •Use this interactive tool to learn how to improve air flow in your home.        When you can be around others after being sick with COVID-19    Deciding when you can be around others is different for different situations. Find out when you can safely end home isolation.    For any additional questions about your care, contact your healthcare provider or state or local health department.    03/22/2022    Content source: National Center for Immunization and Respiratory Diseases (NCIRD), Division of Viral Diseases    This information is not intended to replace advice given to you by your health care provider. Make sure you discuss any questions you have with your health care provider.

## 2022-07-22 NOTE — ED PROVIDER NOTE - PATIENT PORTAL LINK FT
You can access the FollowMyHealth Patient Portal offered by [facility] by registering at the following website: [email or web address]. By joining [facility]’s FollowMyHealth portal, you will also be able to view your health information using other applications (apps) compatible with our system.

## 2022-07-22 NOTE — ED PROVIDER NOTE - RESPIRATORY, MLM
Discharge instructions provided to patient. Verbalized understanding. Alert and oriented. IV lock removed. Ambulated with steady gait out of ED. Breath sounds clear and equal bilaterally. Lungs clear.

## 2022-07-22 NOTE — ED PROVIDER NOTE - PROGRESS NOTE DETAILS
patient seen and evaluated upon arrival initially.  No respiratory distress, feeling SOB and abdominal pain.  Upon re-eval BP still elevated and patient now reporting HA, patient very anxious, takes xanax at home.  ativan ordered here, CT head ordered, however cannot be performed for 6 hours as she had IV contrast for her CTAP.  +COVID status now identified, will treat symptoms and reassess HA and BP.  If improved will dc without head CT, if persists will check.  Patient declines MAB.  Will likely d/c with paxlovid, she will be advised to dc her statin while taking -Marci Moody PA-C On reeval, lungs clear, pulse ox 98% on RA, no distress. BP elevated but no CP/SOB. Labs reassuring. C19 pos, no e/o pna or pneumonitis on CXR by my read. No indication for CTH, neuro intact and HA improving after tyl/aleve. No neck pain or stiffness, no concern for meningitis. NO trauma. Pt amenable to paxlovid, advised to stop statin for 1 week and discuss further with PMD. Declined mab. Has home pulse ox. Strict ED precautions discussed

## 2022-07-22 NOTE — ED ADULT TRIAGE NOTE - CHIEF COMPLAINT QUOTE
Pt arrives to ED complaining of shortness of breath, asthma exacerbation, COPD. Pt also complaining of abdominal distention.

## 2022-07-22 NOTE — ED PROVIDER NOTE - NSICDXPASTSURGICALHX_GEN_ALL_CORE_FT
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

## 2022-07-22 NOTE — ED PROVIDER NOTE - CLINICAL SUMMARY MEDICAL DECISION MAKING FREE TEXT BOX
Pt with a PMHx  of GERD, anemia, hernia, asthma, and hyperlipidemia presented to ED with increased SOB,  abdominal pain, and diarrhea. Likely viral but will due to abdominal discomfort will check labs, CT of abdomen, and reassess.

## 2022-07-22 NOTE — ED PROVIDER NOTE - CARE PLAN
1 Principal Discharge DX:	COVID-19 virus infection  Secondary Diagnosis:	Acute asthma exacerbation  Secondary Diagnosis:	Elevated blood pressure reading

## 2022-07-22 NOTE — ED ADULT NURSE NOTE - OBJECTIVE STATEMENT
Patient presents to the emergency room with complaints of shortness of breath. Patient reports having abdominal distension with four episodes of diarrhea. Patient reports having pressure to upper abdomen and states "I feel like there is fluid in my lungs." Patient denies fever, sick contacts, nausea, weakness. Patient states she has an overactive bladder since having surgery in the past. Patient reports having GERD and dry mouth at this time.

## 2022-07-22 NOTE — ED PROVIDER NOTE - ATTENDING APP SHARED VISIT CONTRIBUTION OF CARE
I, Eber Duggan MD, personally saw the patient with GAURI.  I have personally performed a face to face diagnostic evaluation on this patient.  I have reviewed the GAURI note and agree with the history, exam, and plan of care, except as noted.

## 2022-07-22 NOTE — ED PROVIDER NOTE - TEMPLATE, MLM
What Type Of Note Output Would You Prefer (Optional)?: Standard Output What Is The Reason For Today's Visit?: Full Body Skin Examination What Is The Reason For Today's Visit? (Being Monitored For X): concerning skin lesions on an annual basis Abdominal Pain, N/V/D

## 2022-07-26 ENCOUNTER — NON-APPOINTMENT (OUTPATIENT)
Age: 72
End: 2022-07-26

## 2022-07-26 LAB
-  AMIKACIN: SIGNIFICANT CHANGE UP
-  AMOXICILLIN/CLAVULANIC ACID: SIGNIFICANT CHANGE UP
-  AMPICILLIN/SULBACTAM: SIGNIFICANT CHANGE UP
-  AMPICILLIN: SIGNIFICANT CHANGE UP
-  AZTREONAM: SIGNIFICANT CHANGE UP
-  CEFAZOLIN: SIGNIFICANT CHANGE UP
-  CEFEPIME: SIGNIFICANT CHANGE UP
-  CEFOXITIN: SIGNIFICANT CHANGE UP
-  CEFTRIAXONE: SIGNIFICANT CHANGE UP
-  CIPROFLOXACIN: SIGNIFICANT CHANGE UP
-  ERTAPENEM: SIGNIFICANT CHANGE UP
-  GENTAMICIN: SIGNIFICANT CHANGE UP
-  IMIPENEM: SIGNIFICANT CHANGE UP
-  LEVOFLOXACIN: SIGNIFICANT CHANGE UP
-  MEROPENEM: SIGNIFICANT CHANGE UP
-  NITROFURANTOIN: SIGNIFICANT CHANGE UP
-  PIPERACILLIN/TAZOBACTAM: SIGNIFICANT CHANGE UP
-  TIGECYCLINE: SIGNIFICANT CHANGE UP
-  TOBRAMYCIN: SIGNIFICANT CHANGE UP
-  TRIMETHOPRIM/SULFAMETHOXAZOLE: SIGNIFICANT CHANGE UP
METHOD TYPE: SIGNIFICANT CHANGE UP

## 2022-07-27 LAB
-  AMPICILLIN: SIGNIFICANT CHANGE UP
-  CIPROFLOXACIN: SIGNIFICANT CHANGE UP
-  LEVOFLOXACIN: SIGNIFICANT CHANGE UP
-  NITROFURANTOIN: SIGNIFICANT CHANGE UP
-  TETRACYCLINE: SIGNIFICANT CHANGE UP
-  VANCOMYCIN: SIGNIFICANT CHANGE UP
CULTURE RESULTS: SIGNIFICANT CHANGE UP
METHOD TYPE: SIGNIFICANT CHANGE UP
ORGANISM # SPEC MICROSCOPIC CNT: SIGNIFICANT CHANGE UP
SPECIMEN SOURCE: SIGNIFICANT CHANGE UP

## 2022-07-27 NOTE — ED POST DISCHARGE NOTE - RESULT SUMMARY
urine culture with intermediate colony count morgenalla and enterococcus, sensitivities for enterococcus still pending  Gaby Ulloa PA-C

## 2022-07-29 RX ORDER — AZTREONAM 2 G
1 VIAL (EA) INJECTION
Qty: 14 | Refills: 0
Start: 2022-07-29 | End: 2022-08-04

## 2022-07-30 ENCOUNTER — EMERGENCY (EMERGENCY)
Facility: HOSPITAL | Age: 72
LOS: 0 days | Discharge: HOME | End: 2022-07-31
Attending: EMERGENCY MEDICINE
Payer: MEDICARE

## 2022-07-30 VITALS
TEMPERATURE: 99 F | DIASTOLIC BLOOD PRESSURE: 82 MMHG | OXYGEN SATURATION: 100 % | SYSTOLIC BLOOD PRESSURE: 139 MMHG | RESPIRATION RATE: 18 BRPM | HEART RATE: 87 BPM

## 2022-07-30 VITALS
HEART RATE: 74 BPM | RESPIRATION RATE: 18 BRPM | SYSTOLIC BLOOD PRESSURE: 147 MMHG | DIASTOLIC BLOOD PRESSURE: 90 MMHG | OXYGEN SATURATION: 98 % | TEMPERATURE: 99 F

## 2022-07-30 DIAGNOSIS — K21.9 GASTRO-ESOPHAGEAL REFLUX DISEASE WITHOUT ESOPHAGITIS: ICD-10-CM

## 2022-07-30 DIAGNOSIS — Z86.018 PERSONAL HISTORY OF OTHER BENIGN NEOPLASM: Chronic | ICD-10-CM

## 2022-07-30 DIAGNOSIS — Z98.890 OTHER SPECIFIED POSTPROCEDURAL STATES: ICD-10-CM

## 2022-07-30 DIAGNOSIS — Z87.19 PERSONAL HISTORY OF OTHER DISEASES OF THE DIGESTIVE SYSTEM: Chronic | ICD-10-CM

## 2022-07-30 DIAGNOSIS — R93.41 ABNORMAL RADIOLOGIC FINDINGS ON DIAGNOSTIC IMAGING OF RENAL PELVIS, URETER, OR BLADDER: ICD-10-CM

## 2022-07-30 DIAGNOSIS — Z86.018 PERSONAL HISTORY OF OTHER BENIGN NEOPLASM: ICD-10-CM

## 2022-07-30 DIAGNOSIS — Z88.1 ALLERGY STATUS TO OTHER ANTIBIOTIC AGENTS STATUS: ICD-10-CM

## 2022-07-30 DIAGNOSIS — J44.9 CHRONIC OBSTRUCTIVE PULMONARY DISEASE, UNSPECIFIED: ICD-10-CM

## 2022-07-30 DIAGNOSIS — Z88.0 ALLERGY STATUS TO PENICILLIN: ICD-10-CM

## 2022-07-30 DIAGNOSIS — E78.5 HYPERLIPIDEMIA, UNSPECIFIED: ICD-10-CM

## 2022-07-30 DIAGNOSIS — Z98.890 OTHER SPECIFIED POSTPROCEDURAL STATES: Chronic | ICD-10-CM

## 2022-07-30 DIAGNOSIS — R14.0 ABDOMINAL DISTENSION (GASEOUS): ICD-10-CM

## 2022-07-30 DIAGNOSIS — Z88.5 ALLERGY STATUS TO NARCOTIC AGENT: ICD-10-CM

## 2022-07-30 DIAGNOSIS — R06.02 SHORTNESS OF BREATH: ICD-10-CM

## 2022-07-30 DIAGNOSIS — Z87.19 PERSONAL HISTORY OF OTHER DISEASES OF THE DIGESTIVE SYSTEM: ICD-10-CM

## 2022-07-30 DIAGNOSIS — R10.9 UNSPECIFIED ABDOMINAL PAIN: ICD-10-CM

## 2022-07-30 LAB
ALBUMIN SERPL ELPH-MCNC: 3.5 G/DL — SIGNIFICANT CHANGE UP (ref 3.3–5)
ALP SERPL-CCNC: 65 U/L — SIGNIFICANT CHANGE UP (ref 40–120)
ALT FLD-CCNC: 34 U/L — SIGNIFICANT CHANGE UP (ref 12–78)
ANION GAP SERPL CALC-SCNC: 7 MMOL/L — SIGNIFICANT CHANGE UP (ref 5–17)
APPEARANCE UR: CLEAR — SIGNIFICANT CHANGE UP
AST SERPL-CCNC: 23 U/L — SIGNIFICANT CHANGE UP (ref 15–37)
BASOPHILS # BLD AUTO: 0.04 K/UL — SIGNIFICANT CHANGE UP (ref 0–0.2)
BASOPHILS NFR BLD AUTO: 0.3 % — SIGNIFICANT CHANGE UP (ref 0–2)
BILIRUB SERPL-MCNC: 0.3 MG/DL — SIGNIFICANT CHANGE UP (ref 0.2–1.2)
BILIRUB UR-MCNC: NEGATIVE — SIGNIFICANT CHANGE UP
BUN SERPL-MCNC: 24 MG/DL — HIGH (ref 7–23)
CALCIUM SERPL-MCNC: 8.8 MG/DL — SIGNIFICANT CHANGE UP (ref 8.5–10.1)
CHLORIDE SERPL-SCNC: 105 MMOL/L — SIGNIFICANT CHANGE UP (ref 96–108)
CO2 SERPL-SCNC: 25 MMOL/L — SIGNIFICANT CHANGE UP (ref 22–31)
COLOR SPEC: YELLOW — SIGNIFICANT CHANGE UP
CREAT SERPL-MCNC: 1.34 MG/DL — HIGH (ref 0.5–1.3)
DIFF PNL FLD: NEGATIVE — SIGNIFICANT CHANGE UP
EGFR: 43 ML/MIN/1.73M2 — LOW
EOSINOPHIL # BLD AUTO: 0.12 K/UL — SIGNIFICANT CHANGE UP (ref 0–0.5)
EOSINOPHIL NFR BLD AUTO: 1 % — SIGNIFICANT CHANGE UP (ref 0–6)
GLUCOSE SERPL-MCNC: 104 MG/DL — HIGH (ref 70–99)
GLUCOSE UR QL: NEGATIVE — SIGNIFICANT CHANGE UP
HCT VFR BLD CALC: 43 % — SIGNIFICANT CHANGE UP (ref 34.5–45)
HGB BLD-MCNC: 14.6 G/DL — SIGNIFICANT CHANGE UP (ref 11.5–15.5)
IMM GRANULOCYTES NFR BLD AUTO: 2.7 % — HIGH (ref 0–1.5)
KETONES UR-MCNC: NEGATIVE — SIGNIFICANT CHANGE UP
LEUKOCYTE ESTERASE UR-ACNC: NEGATIVE — SIGNIFICANT CHANGE UP
LIDOCAIN IGE QN: 295 U/L — SIGNIFICANT CHANGE UP (ref 73–393)
LYMPHOCYTES # BLD AUTO: 16.7 % — SIGNIFICANT CHANGE UP (ref 13–44)
LYMPHOCYTES # BLD AUTO: 2 K/UL — SIGNIFICANT CHANGE UP (ref 1–3.3)
MCHC RBC-ENTMCNC: 31.5 PG — SIGNIFICANT CHANGE UP (ref 27–34)
MCHC RBC-ENTMCNC: 34 GM/DL — SIGNIFICANT CHANGE UP (ref 32–36)
MCV RBC AUTO: 92.9 FL — SIGNIFICANT CHANGE UP (ref 80–100)
MONOCYTES # BLD AUTO: 0.9 K/UL — SIGNIFICANT CHANGE UP (ref 0–0.9)
MONOCYTES NFR BLD AUTO: 7.5 % — SIGNIFICANT CHANGE UP (ref 2–14)
NEUTROPHILS # BLD AUTO: 8.63 K/UL — HIGH (ref 1.8–7.4)
NEUTROPHILS NFR BLD AUTO: 71.8 % — SIGNIFICANT CHANGE UP (ref 43–77)
NITRITE UR-MCNC: NEGATIVE — SIGNIFICANT CHANGE UP
PH UR: 6.5 — SIGNIFICANT CHANGE UP (ref 5–8)
PLATELET # BLD AUTO: 305 K/UL — SIGNIFICANT CHANGE UP (ref 150–400)
POTASSIUM SERPL-MCNC: 4.4 MMOL/L — SIGNIFICANT CHANGE UP (ref 3.5–5.3)
POTASSIUM SERPL-SCNC: 4.4 MMOL/L — SIGNIFICANT CHANGE UP (ref 3.5–5.3)
PROT SERPL-MCNC: 7 GM/DL — SIGNIFICANT CHANGE UP (ref 6–8.3)
PROT UR-MCNC: NEGATIVE — SIGNIFICANT CHANGE UP
RBC # BLD: 4.63 M/UL — SIGNIFICANT CHANGE UP (ref 3.8–5.2)
RBC # FLD: 13.2 % — SIGNIFICANT CHANGE UP (ref 10.3–14.5)
SODIUM SERPL-SCNC: 137 MMOL/L — SIGNIFICANT CHANGE UP (ref 135–145)
SP GR SPEC: 1.01 — SIGNIFICANT CHANGE UP (ref 1.01–1.02)
TROPONIN I, HIGH SENSITIVITY RESULT: 6.67 NG/L — SIGNIFICANT CHANGE UP
UROBILINOGEN FLD QL: NEGATIVE — SIGNIFICANT CHANGE UP
WBC # BLD: 12.01 K/UL — HIGH (ref 3.8–10.5)
WBC # FLD AUTO: 12.01 K/UL — HIGH (ref 3.8–10.5)

## 2022-07-30 PROCEDURE — 99285 EMERGENCY DEPT VISIT HI MDM: CPT

## 2022-07-30 PROCEDURE — 85025 COMPLETE CBC W/AUTO DIFF WBC: CPT

## 2022-07-30 PROCEDURE — 96374 THER/PROPH/DIAG INJ IV PUSH: CPT | Mod: XU

## 2022-07-30 PROCEDURE — 74177 CT ABD & PELVIS W/CONTRAST: CPT | Mod: 26,MA

## 2022-07-30 PROCEDURE — 74177 CT ABD & PELVIS W/CONTRAST: CPT | Mod: MA

## 2022-07-30 PROCEDURE — 71045 X-RAY EXAM CHEST 1 VIEW: CPT | Mod: 26

## 2022-07-30 PROCEDURE — 99285 EMERGENCY DEPT VISIT HI MDM: CPT | Mod: 25

## 2022-07-30 PROCEDURE — 93010 ELECTROCARDIOGRAM REPORT: CPT

## 2022-07-30 PROCEDURE — 93005 ELECTROCARDIOGRAM TRACING: CPT

## 2022-07-30 PROCEDURE — 36415 COLL VENOUS BLD VENIPUNCTURE: CPT

## 2022-07-30 PROCEDURE — 71045 X-RAY EXAM CHEST 1 VIEW: CPT

## 2022-07-30 PROCEDURE — 87086 URINE CULTURE/COLONY COUNT: CPT

## 2022-07-30 PROCEDURE — 81003 URINALYSIS AUTO W/O SCOPE: CPT

## 2022-07-30 PROCEDURE — 96375 TX/PRO/DX INJ NEW DRUG ADDON: CPT

## 2022-07-30 PROCEDURE — 80053 COMPREHEN METABOLIC PANEL: CPT

## 2022-07-30 PROCEDURE — 83690 ASSAY OF LIPASE: CPT

## 2022-07-30 PROCEDURE — 84484 ASSAY OF TROPONIN QUANT: CPT

## 2022-07-30 RX ORDER — ONDANSETRON 8 MG/1
4 TABLET, FILM COATED ORAL ONCE
Refills: 0 | Status: COMPLETED | OUTPATIENT
Start: 2022-07-30 | End: 2022-07-30

## 2022-07-30 RX ORDER — FAMOTIDINE 10 MG/ML
20 INJECTION INTRAVENOUS ONCE
Refills: 0 | Status: COMPLETED | OUTPATIENT
Start: 2022-07-30 | End: 2022-07-30

## 2022-07-30 RX ORDER — SODIUM CHLORIDE 9 MG/ML
1000 INJECTION INTRAMUSCULAR; INTRAVENOUS; SUBCUTANEOUS ONCE
Refills: 0 | Status: COMPLETED | OUTPATIENT
Start: 2022-07-30 | End: 2022-07-30

## 2022-07-30 RX ADMIN — ONDANSETRON 4 MILLIGRAM(S): 8 TABLET, FILM COATED ORAL at 19:53

## 2022-07-30 RX ADMIN — SODIUM CHLORIDE 1000 MILLILITER(S): 9 INJECTION INTRAMUSCULAR; INTRAVENOUS; SUBCUTANEOUS at 19:53

## 2022-07-30 RX ADMIN — FAMOTIDINE 20 MILLIGRAM(S): 10 INJECTION INTRAVENOUS at 19:54

## 2022-07-30 RX ADMIN — Medication 30 MILLILITER(S): at 19:53

## 2022-07-30 NOTE — ED PROVIDER NOTE - CLINICAL SUMMARY MEDICAL DECISION MAKING FREE TEXT BOX
Plan: labs, Monitor, observe, reassess. Low suspicion for acute abdomen given recent ct unremarkable for similar symptoms although today slightly worse.  Less suspicious for atypical acs.  Will eval for gerd/gastritis vs pancreatitis.  Less likely biliary pathology.  Consider SBO, but no preceding risk factors.      Plan: labs, meds, imaging, Monitor, observe, reassess.

## 2022-07-30 NOTE — ED ADULT NURSE NOTE - OBJECTIVE STATEMENT
pt presents ambulatory to ER for evaluation of abdominal pain and dry mouth. endorses epigastric discomfort. pt presents ambulatory to ER for evaluation of abdominal pain, diarrhea and dry mouth. endorses epigastric discomfort. states she had been taking paxlovid for recent covid diagnosis.

## 2022-07-30 NOTE — ED PROVIDER NOTE - PATIENT PORTAL LINK FT
You can access the FollowMyHealth Patient Portal offered by [facility] by registering at the following website: [email or web address]. By joining [facility]’s FollowMyHealth portal, you will also be able to view your health information using other applications (apps) compatible with our system.

## 2022-07-30 NOTE — ED PROVIDER NOTE - OBJECTIVE STATEMENT
70 y/o female with a PMHx of asthma, COPD presents to the ED c/o abd distended. Pt c/o pressure in lungs, trouble breathing. Had COVID on July 22nd and took prednisone and paxlovid. Pt had diarrhea and gas after coming home.

## 2022-07-30 NOTE — ED PROVIDER NOTE - CARE PROVIDER_API CALL
Bienvenido Goodson (MD)  Gastroenterology; Internal Medicine  [address] [facility], [address] [place] [address]  Phone: [phone]  Fax: [phone]  Follow Up Time: 1-3 Days

## 2022-07-30 NOTE — ED PROVIDER NOTE - PROGRESS NOTE DETAILS
On repeat examination, the patient does not have any tenderness to palpation, guarding or rebound of her abdomen.  Patient states that she is experiencing inflammation and not pain at this time.  I advised her that the CT scan showed a 4 to 5 cm radiopaque density in the left abdomen with small bowel consistent with a possible foreign body.  Patient denies swallowing any potential foreign body and is not having localized pain to the area.  I advised her that any potential foreign body in that area would pass in her stool.  Patient notes that one of her main issues is that she has not been able to have good bowel movements over the past couple days.  She is requesting a "anti-inflammatory" for abdominal distention.  Advised her I could prescribe her Bentyl which will help with cramping from peristalsis.  Patient will need to follow-up with gastroenterology as an outpatient as well.    Kvng Mendosa, DO

## 2022-07-30 NOTE — ED PROVIDER NOTE - PHYSICAL EXAMINATION
Constitutional: NAD, well appearing  HEENT: no rhinorrhea, PERRL, no oropharyngeal erythema or exudates, midline uvula.  TMs clear.  CVS:  RRR, no m/r/g  Resp:  CTAB  GI: mild abd distension, soft, nt  MSK:  no restriction to rom, full ROM to all extremities  Neuro:  A&Ox3, 5/5 strength to all extremities,  SILT to all extremities  Skin: no rash  psych: clear thought content  Heme/lymph:  No LAD

## 2022-07-30 NOTE — ED ADULT TRIAGE NOTE - CHIEF COMPLAINT QUOTE
Pt presents with c/o of abdominal pain and dry mouth.  Pt was seen at  on the 22nd, diagnosed with covid.  Returning with same complaints.

## 2022-07-30 NOTE — ED ADULT TRIAGE NOTE - CADM TRG TX PRIOR TO ARRIVAL
ID Progress Note              Subjective:  Patient in COVID 19 isolation, no fever, in RA, feels ok.  No diarrhea.     Review of Systems:   Review of Systems   Constitution: Negative for chills and fever.   HENT: Negative.    Eyes: Negative.    Cardiovascular: Negative.    Respiratory: Negative for cough, shortness of breath and sputum production.    Endocrine: Negative.    Skin: Negative.    Musculoskeletal: Negative.    Gastrointestinal: Negative for abdominal pain, diarrhea, nausea and vomiting.   Genitourinary: Negative.    Neurological: Positive for weakness.   Psychiatric/Behavioral: Negative.        I/O's  No intake or output data in the 24 hours ending 12/08/20 1514        ALLERGIES:  Shellfish allergy   (food or med), Tramadol, Seasonal, and Tramadol-acetaminophen     Hospital Meds  Current Facility-Administered Medications   Medication Dose Route Frequency Provider Last Rate Last Admin   • sodium chloride 0.9% infusion   Intravenous Continuous Tasha Magana  mL/hr at 12/05/20 0952 New Bag at 12/05/20 0952   • acyclovir (ZOVIRAX) 200 MG/5ML suspension 400 mg  400 mg Oral Q12H Norman Ba CNP   400 mg at 12/08/20 0535   • guaiFENesin (MUCINEX) ER tablet 1,200 mg  1,200 mg Oral 2 times per day Tasha Magana MD   1,200 mg at 12/06/20 0808   • zolpidem (AMBIEN) tablet 5 mg  5 mg Oral Nightly PRN Anthony Moreno MD   5 mg at 12/03/20 2219   • posaconazole (NOXAFIL) tablet 300 mg  300 mg Oral Daily with breakfast Myra Sullivan CNP   300 mg at 12/08/20 0916   • sodium chloride 0.9% infusion   Intravenous Continuous PRN Alisa Canada MD       • sodium chloride 0.9% infusion   Intravenous Continuous PRN Tyrone Hooper MD       • sodium chloride 0.9% infusion   Intravenous Continuous PRN Tyrone Hooper MD 25 mL/hr at 11/29/20 2115 New Bag at 11/29/20 2115   • guaifenesin 100 MG/5ML solution 200 mg  200 mg Oral Q4H PRN Tyrone Hooper MD   200 mg at 12/06/20 2034   • benzonatate (TESSALON PERLES)  capsule 100 mg  100 mg Oral TID PRN Tyrone Hooper MD   100 mg at 11/29/20 1722   • anastrozole (ARIMIDEX) tablet 1 mg  1 mg Oral Daily Viry Mccormick MD   1 mg at 12/08/20 0915   • famotidine (PEPCID) tablet 40 mg  40 mg Oral Daily Tyrone Hooper MD   40 mg at 12/08/20 0916   • heparin (porcine) injection 3,800 Units  60 Units/kg (Dosing Weight) Intravenous PRN Tyrone Hooper MD   3,800 Units at 11/21/20 2313   • heparin (porcine) injection 1,900 Units  30 Units/kg (Dosing Weight) Intravenous PRN Tyrone Hooper MD   1,900 Units at 11/22/20 2230   • magnesium sulfate 2 g in 50 mL premix IVPB  2 g Intravenous Once Niko MEREDITH Wylie MD       • ascorbic acid (VITAMIN C) tablet 1,000 mg  1,000 mg Oral Daily Norman Ba CNP   1,000 mg at 12/08/20 0915   • docusate sodium-sennosides (SENOKOT S) 50-8.6 MG 1 tablet  1 tablet Oral Nightly PRN Tarsalazar Ba CNP       • gabapentin (NEURONTIN) capsule 100 mg  100 mg Oral BID Norman Ba CNP   100 mg at 12/08/20 0916   • LORazepam (ATIVAN) tablet 0.5 mg  0.5 mg Oral Q12H PRN Tarsalazar Ba CNP   0.5 mg at 11/26/20 2145   • magnesium hydroxide (MILK OF MAGNESIA) 400 MG/5ML suspension 30 mL  30 mL Oral Daily PRN Tarsalazar Ba CNP       • metoPROLOL tartrate (LOPRESSOR) tablet 25 mg  25 mg Oral Daily Tarsalazar Ba CNP   25 mg at 12/08/20 0916   • prochlorperazine (COMPAZINE) tablet 10 mg  10 mg Oral Q6H PRN Tarsalazar Ba CNP       • acetaminophen (TYLENOL) tablet 650 mg  650 mg Oral Q4H PRN Tarsalazar Ba CNP   650 mg at 11/23/20 0811   • ondansetron (ZOFRAN) injection 4 mg  4 mg Intravenous Q12H PRN Norman Ba CNP       • potassium CHLORIDE (KLOR-CON M) [place] ER tablet 20 mEq  20 mEq Oral Daily Norman Ba CNP   20 mEq at 12/08/20 0915   • zinc sulfate (ZINCATE) capsule 220 mg  220 mg Oral BID WC Norman Ba CNP   220 mg at 12/08/20 0916        Last Recorded Vitals      SpO2 Readings from Last 3 Encounters:   12/08/20 100%   11/16/20 99%    11/11/20 100%        VITAL SIGNS:     Vital Last Value 24 Hour Range   Temperature 97.2 °F (36.2 °C) (12/08/20 1307) Temp  Min: 97.2 °F (36.2 °C)  Max: 98.2 °F (36.8 °C)   Pulse 78 (12/08/20 1307) Pulse  Min: 70  Max: 83   Respiratory 16 (12/08/20 1307) Resp  Min: 16  Max: 18   Non-Invasive  Blood Pressure 108/70 (12/08/20 1307) BP  Min: 99/57  Max: 108/70   Pulse Oximetry 100 % (12/08/20 1307) SpO2  Min: 96 %  Max: 100 %     Vital Today Admitted   Weight 61.2 kg (135 lb) (11/19/20 1246) Weight: 61.2 kg (135 lb) (11/19/20 1246)   Height N/A Height: 5' 7\" (170.2 cm) (11/19/20 2100)   BMI N/A         Physical Exam:  Physical Exam   Constitutional: She is oriented to person, place, and time. No distress.   HENT:   Head: Normocephalic.   Eyes: Conjunctivae are normal.   Neck: Neck supple.   Cardiovascular: Normal rate.   Pulmonary/Chest: Effort normal. No respiratory distress.   Abdominal: She exhibits no distension. There is no abdominal tenderness. There is no rebound.   Musculoskeletal:         General: No tenderness.   Neurological: She is alert and oriented to person, place, and time.   Skin: No rash noted. There is pallor.        Lab:    Recent Labs   Lab 12/08/20  0429 12/07/20  0449 12/06/20  0407 12/05/20  0524  11/29/20  0501  11/27/20  0439   WBC 2.2* 1.9* 1.8* 1.6*   < > 1.1*   < > 3.6*   RBC 2.77* 2.21* 2.34* 2.43*   < > 2.16*   < > 2.65*   HGB 8.5* 6.9* 7.3* 7.5*   < > 6.7*   < > 8.1*   HCT 25.5* 20.9* 21.9* 23.0*   < > 20.8*   < > 25.0*   MCV 92.1 94.6 93.6 94.7   < > 96.3   < > 94.3   MCH 30.7 31.2 31.2 30.9   < > 31.0   < > 30.6   MCHC 33.3 33.0 33.3 32.6   < > 32.2   < > 32.4   RDW-CV 15.4* 15.5* 15.4* 15.1*   < > 17.0*   < > 17.8*   PLT 41* 32* 25* 21*   < > 11*   < > 22*   LYMPH  --   --   --   --   --  44  --  14   Lymphocytes, Percent  --   --   --  55  --   --   --   --     < > = values in this interval not displayed.       Recent Labs   Lab 12/08/20  0429 12/07/20  0449 12/05/20  0524  none 12/04/20  0416 12/03/20  0445 12/02/20  0404 12/01/20  0433   Sodium 138 137 138 138 136 135 136   Chloride 111* 110* 110* 109* 106 106 107   BUN 13 13 15 14 16 15 17   GFR Estimate,   --   --   --  74 73 77 77   BUN/Creatinine Ratio  --   --   --  15 18 17 20   BUN/ Creatinine Ratio 14 16 17  --   --   --   --    Potassium 3.8 3.8 4.4 4.2 4.6 4.4 4.2   Glucose 88 79 88 81 83 85 82   Creatinine 0.90 0.82 0.87 0.90 0.91 0.87 0.87   GFR Estimate, Non   --   --   --  64 63 67 67   CALCIUM  --   --   --  9.1 9.2 9.1 9.1   Calcium 8.8 8.8 8.8  --   --   --   --        No results found    Microbiology Results  (Last 10 results in the past 7 days)    Specimen   Gram Smear   Culture Result   Status      11/19/20  1500   11/19/20  1500  11/19/20  1500     URINE, CLEAN CATCH/MIDSTREAM   NO GROWTH. 11/21/2020 FINAL          Imaging:  US VASC LOWER EXTREMITY VENOUS DUPLEX BILATERAL   Final Result      No evidence of acute DVT of the investigated bilateral lower extremities as above.            Electronically Signed by: NGOZI MOORE MD    Signed on: 11/23/2020 4:43 PM          XR CHEST PA OR AP 1 VIEW   Final Result   No acute radiographic abnormality.  Chronic background findings as described above.      Electronically Signed by: ENOC BERNARDO M.D.    Signed on: 11/19/2020 2:53 PM              Assessment/Plan:  1. COVID-19, weak and tired, could also be r/t anemia,  improving, RA, no sob, dry cough.  Patient is immunocompromised.   Elevated inflammatory markers.   2.  History of AML, recent chemo,  3.  Recent history of Pseudomonas bacteremia, C. difficile  4.  Abnormal urine analysis with pyuria, no symptom, cx negative.   5.  History of breast cancer     Recommendation:  1. Completed remdesivir, 5 days course.   2. Off Dexamethasone.  3. Continue oral acyclovir and posaconazole for prophylaxis.    5. Monitor temps,  We will order CBC with differential, CMP   6. Anticoagulation per hematology.      7. Continue isolation.    dw patient  dw RN      This note may have been transcribed with voice recognition software/M*Modal Fluency dictation system in part or in whole.  There may be voice recognition errors which should not be taken to alter content or meaning.    12/8/2020  3:14 PM    Yamila Vasquez MD, F IDSA  [facility] Infectious Disease and Internal Medicine

## 2022-07-30 NOTE — ED PROVIDER NOTE - NS ED ROS FT
Constitutional: nad, well appearing  HEENT:  no nasal congestion, eye drainage or ear pain.    CVS:  no cp  Resp:  + trouble breathing, No sob, no cough  GI:  + abd distention, no abdominal pain, no nausea or vomiting  :  no dysuria  MSK: no joint pain or limited ROM  Skin: no rash  Neuro: no change in mental status or level of consciousness  Heme/lymph: no bleeding

## 2022-07-31 LAB
CULTURE RESULTS: SIGNIFICANT CHANGE UP
SPECIMEN SOURCE: SIGNIFICANT CHANGE UP

## 2022-07-31 RX ORDER — ALPRAZOLAM 0.25 MG
0.5 TABLET ORAL ONCE
Refills: 0 | Status: DISCONTINUED | OUTPATIENT
Start: 2022-07-31 | End: 2022-07-31

## 2022-07-31 RX ORDER — MULTIVIT WITH MIN/MFOLATE/K2 340-15/3 G
1 POWDER (GRAM) ORAL ONCE
Refills: 0 | Status: COMPLETED | OUTPATIENT
Start: 2022-07-31 | End: 2022-07-31

## 2022-07-31 RX ADMIN — Medication 20 MILLIGRAM(S): at 00:32

## 2022-07-31 RX ADMIN — Medication 0.5 MILLIGRAM(S): at 00:22

## 2022-07-31 RX ADMIN — Medication 1 BOTTLE: at 00:32

## 2022-08-09 ENCOUNTER — NON-APPOINTMENT (OUTPATIENT)
Age: 72
End: 2022-08-09

## 2022-08-09 DIAGNOSIS — Z92.89 PERSONAL HISTORY OF OTHER MEDICAL TREATMENT: ICD-10-CM

## 2022-08-09 DIAGNOSIS — M85.80 OTHER SPECIFIED DISORDERS OF BONE DENSITY AND STRUCTURE, UNSPECIFIED SITE: ICD-10-CM

## 2022-08-09 DIAGNOSIS — Z98.890 OTHER SPECIFIED POSTPROCEDURAL STATES: ICD-10-CM

## 2022-08-12 ENCOUNTER — APPOINTMENT (OUTPATIENT)
Dept: INTERNAL MEDICINE | Facility: CLINIC | Age: 72
End: 2022-08-12

## 2022-08-12 ENCOUNTER — LABORATORY RESULT (OUTPATIENT)
Age: 72
End: 2022-08-12

## 2022-08-12 VITALS
OXYGEN SATURATION: 98 % | TEMPERATURE: 98.3 F | HEIGHT: 64 IN | DIASTOLIC BLOOD PRESSURE: 72 MMHG | SYSTOLIC BLOOD PRESSURE: 124 MMHG | HEART RATE: 73 BPM

## 2022-08-12 DIAGNOSIS — R93.89 ABNORMAL FINDINGS ON DIAGNOSTIC IMAGING OF OTHER SPECIFIED BODY STRUCTURES: ICD-10-CM

## 2022-08-12 DIAGNOSIS — R14.0 ABDOMINAL DISTENSION (GASEOUS): ICD-10-CM

## 2022-08-12 DIAGNOSIS — U07.1 COVID-19: ICD-10-CM

## 2022-08-12 PROCEDURE — 99214 OFFICE O/P EST MOD 30 MIN: CPT | Mod: 25

## 2022-08-12 PROCEDURE — 36415 COLL VENOUS BLD VENIPUNCTURE: CPT

## 2022-08-12 RX ORDER — FLUCONAZOLE 100 MG/1
100 TABLET ORAL DAILY
Qty: 7 | Refills: 0 | Status: DISCONTINUED | COMMUNITY
Start: 2022-05-19 | End: 2022-08-12

## 2022-08-12 NOTE — PHYSICAL EXAM
[No Acute Distress] : no acute distress [Well Nourished] : well nourished [Well Developed] : well developed [Normal Oropharynx] : the oropharynx was normal [No Lymphadenopathy] : no lymphadenopathy [No Respiratory Distress] : no respiratory distress  [No Accessory Muscle Use] : no accessory muscle use [Clear to Auscultation] : lungs were clear to auscultation bilaterally [Normal Rate] : normal rate  [Regular Rhythm] : with a regular rhythm [Normal S1, S2] : normal S1 and S2 [Soft] : abdomen soft [Non Tender] : non-tender [Normal Bowel Sounds] : normal bowel sounds [Coordination Grossly Intact] : coordination grossly intact [No Focal Deficits] : no focal deficits [Normal Gait] : normal gait [Normal Affect] : the affect was normal [Alert and Oriented x3] : oriented to person, place, and time [Normal Insight/Judgement] : insight and judgment were intact [de-identified] : right TM  slight  bulge, + erythema

## 2022-08-12 NOTE — ASSESSMENT
[FreeTextEntry1] : 1. Sinusitis  d/p COVID \par Augmentin 875- 125 m g po BID #20\par increase fluids\par Flonase 2 squirts both nares daily \par 2.abnormal radiographic density/ abdomen \par XRAY Abdomen to assess resolution , discussed with Dr. Urbina \par recommend  f/up with GI MD \par 3/ S/p UTI \par Urine culture sent \par TO ER with any emergent symptoms \par \par \par

## 2022-08-12 NOTE — HISTORY OF PRESENT ILLNESS
[FreeTextEntry1] : f/up  [de-identified] : Pt is a 71 yr. old female with a h/o anxiety, HLD, HTN, prediabetes, asthma with COPD tested + COVID on 7/22/22. Pt was treated with Paxlovid  and Prednisone . She went to  the ER 7/30/22 with c/ of abdominal distension   and SOB. CRXY showed clear lungs. Abd CT revealed radiodensity in small bowel loop 4 cm likely foreign body ingestion., negative infection or inflammation. \par Pt was discharged home on Bactrim for + UTI , complete course. Denies UTI symptoms. Will send urine culture today to assess resolution. \par \par She arrives today , c/ of nasal congestion and right ear pain, feels  like  she has a " sinus infection." This is associated with frontal headache and teeth pain. Pt denies fever, chills, chest pain, cough. \par She has been taking the Bentyl since her COVID diagnosis, which has relieved her sx's of bloating and "stomach Upset."She has not followed up with GI MD. Tolerating pos' without difficulty . \par Denies abdominal pain, N/V, diarrhea.\par \par

## 2022-08-12 NOTE — REVIEW OF SYSTEMS
[Negative] : Psychiatric [Fever] : no fever [Chills] : no chills [Fatigue] : no fatigue [FreeTextEntry7] : see HPI

## 2022-08-15 ENCOUNTER — NON-APPOINTMENT (OUTPATIENT)
Age: 72
End: 2022-08-15

## 2022-08-15 ENCOUNTER — OUTPATIENT (OUTPATIENT)
Dept: OUTPATIENT SERVICES | Facility: HOSPITAL | Age: 72
LOS: 1 days | End: 2022-08-15
Payer: MEDICARE

## 2022-08-15 ENCOUNTER — APPOINTMENT (OUTPATIENT)
Dept: RADIOLOGY | Facility: CLINIC | Age: 72
End: 2022-08-15

## 2022-08-15 DIAGNOSIS — Z98.890 OTHER SPECIFIED POSTPROCEDURAL STATES: Chronic | ICD-10-CM

## 2022-08-15 DIAGNOSIS — Z87.19 PERSONAL HISTORY OF OTHER DISEASES OF THE DIGESTIVE SYSTEM: Chronic | ICD-10-CM

## 2022-08-15 DIAGNOSIS — Z86.018 PERSONAL HISTORY OF OTHER BENIGN NEOPLASM: Chronic | ICD-10-CM

## 2022-08-15 DIAGNOSIS — R93.89 ABNORMAL FINDINGS ON DIAGNOSTIC IMAGING OF OTHER SPECIFIED BODY STRUCTURES: ICD-10-CM

## 2022-08-15 PROCEDURE — 74019 RADEX ABDOMEN 2 VIEWS: CPT | Mod: 26

## 2022-08-15 PROCEDURE — 74019 RADEX ABDOMEN 2 VIEWS: CPT

## 2022-08-16 ENCOUNTER — NON-APPOINTMENT (OUTPATIENT)
Age: 72
End: 2022-08-16

## 2022-08-16 ENCOUNTER — LABORATORY RESULT (OUTPATIENT)
Age: 72
End: 2022-08-16

## 2022-08-16 DIAGNOSIS — Z87.42 PERSONAL HISTORY OF OTHER DISEASES OF THE FEMALE GENITAL TRACT: ICD-10-CM

## 2022-08-16 DIAGNOSIS — Z78.0 ASYMPTOMATIC MENOPAUSAL STATE: ICD-10-CM

## 2022-08-16 DIAGNOSIS — Z01.419 ENCOUNTER FOR GYNECOLOGICAL EXAMINATION (GENERAL) (ROUTINE) W/OUT ABNORMAL FINDINGS: ICD-10-CM

## 2022-08-16 DIAGNOSIS — Z83.6 FAMILY HISTORY OF OTHER DISEASES OF THE RESPIRATORY SYSTEM: ICD-10-CM

## 2022-08-16 DIAGNOSIS — Z83.3 FAMILY HISTORY OF DIABETES MELLITUS: ICD-10-CM

## 2022-08-16 DIAGNOSIS — Z78.9 OTHER SPECIFIED HEALTH STATUS: ICD-10-CM

## 2022-08-18 ENCOUNTER — NON-APPOINTMENT (OUTPATIENT)
Age: 72
End: 2022-08-18

## 2022-08-26 ENCOUNTER — RESULT CHARGE (OUTPATIENT)
Age: 72
End: 2022-08-26

## 2022-08-26 ENCOUNTER — APPOINTMENT (OUTPATIENT)
Dept: OBGYN | Facility: CLINIC | Age: 72
End: 2022-08-26

## 2022-08-26 VITALS — SYSTOLIC BLOOD PRESSURE: 159 MMHG | DIASTOLIC BLOOD PRESSURE: 90 MMHG

## 2022-08-26 LAB
BILIRUB UR QL STRIP: NEGATIVE
CLARITY UR: CLEAR
COLLECTION METHOD: NORMAL
GLUCOSE UR-MCNC: NEGATIVE
HCG UR QL: 0.2 EU/DL
HEMOGLOBIN: 13.1
HGB UR QL STRIP.AUTO: NORMAL
KETONES UR-MCNC: NEGATIVE
LEUKOCYTE ESTERASE UR QL STRIP: NEGATIVE
NITRITE UR QL STRIP: NEGATIVE
PH UR STRIP: 5.5
PROT UR STRIP-MCNC: NEGATIVE
SP GR UR STRIP: 1.02

## 2022-08-26 PROCEDURE — 81003 URINALYSIS AUTO W/O SCOPE: CPT | Mod: QW

## 2022-08-26 PROCEDURE — 99397 PER PM REEVAL EST PAT 65+ YR: CPT

## 2022-08-26 PROCEDURE — 85018 HEMOGLOBIN: CPT | Mod: QW

## 2022-08-26 NOTE — PHYSICAL EXAM
[Appropriately responsive] : appropriately responsive [No Lymphadenopathy] : no lymphadenopathy [Soft] : soft [Non-tender] : non-tender [Oriented x3] : oriented x3 [Examination Of The Breasts] : a normal appearance [No Discharge] : no discharge [No Masses] : no breast masses were palpable [Vulvar Atrophy] : vulvar atrophy [Labia Majora] : normal [Labia Minora] : normal [Atrophy] : atrophy [Normal] : normal [Absent] : absent [Uterine Adnexae] : normal [Declined] : Patient declined rectal exam [FreeTextEntry4] : moderate to severe atrophy  [FreeTextEntry9] : patient refused

## 2022-08-26 NOTE — HISTORY OF PRESENT ILLNESS
[TextBox_4] : Patient is a 72y/o female here today for annual visit. Pt c/o bladder urgency, patient sees urology. Patient is followed by GI for "GI symptoms of covid" Denies blood in stool or change in bowel habits.

## 2022-08-26 NOTE — PLAN
[FreeTextEntry1] : Patient to follow up in 1 year for annual GYN exam\par Mammogram due: 04/203\par Cologard: now patient has at home from her insurance company \par Bone density due: 2023 osteopenia. \par Pap ordered\par Hemoccult ordered \par patient to f/u with urologist for urinary urgency. \par All questions answered, patient agreeable with plan.\par

## 2022-09-01 LAB — CYTOLOGY CVX/VAG DOC THIN PREP: ABNORMAL

## 2022-09-14 NOTE — ED PROVIDER NOTE - WR ORDER ID 1
[id number] Rhombic Flap Text: The defect edges were debeveled with a #15 scalpel blade.  Given the location of the defect and the proximity to free margins a rhombic flap was deemed most appropriate.  Using a sterile surgical marker, an appropriate rhombic flap was drawn incorporating the defect.    The area thus outlined was incised deep to adipose tissue with a #15 scalpel blade.  The skin margins were undermined to an appropriate distance in all directions utilizing iris scissors.

## 2022-11-30 ENCOUNTER — APPOINTMENT (OUTPATIENT)
Dept: INTERNAL MEDICINE | Facility: CLINIC | Age: 72
End: 2022-11-30

## 2022-11-30 PROCEDURE — 99441: CPT

## 2022-11-30 NOTE — ASSESSMENT
[FreeTextEntry1] : 1.Sinusitis with COPD exacerbation: pt refuses to come in to office or urgent care/ED due to limited visits left from her insurance. speaking in full sentences and no distress at this time. \par Start prednisone taper today and given rx for augmentin as well. \par Refuses covid testing as well. \par Increase fluids, rest. \par Take Tylenol 500 mg 1-2 tabs as needed every 8 hours for pain. \par Call for new or worsening symptoms.\par Discussed signs and symptoms to warrant urgent evaluation with patient.\par \par 2.HTG: refilled her fenofibrate today to pharmacy. 
No

## 2022-11-30 NOTE — HISTORY OF PRESENT ILLNESS
[Home] : at home, [unfilled] , at the time of the visit. [Medical Office: ([facility])___] : at the medical office located in  [Verbal consent obtained from patient] : the patient, [unfilled] [FreeTextEntry1] : FU [de-identified] : RUY GUAMAN is a 72 year F who calls in for a follow up visit.\par PT notes sinus congestion for the past 2 weeks with wheezing. She has rhinorrhea with yellow mucous.\par Did not do home covid test. \par \par Patient denies any cp, sob,abdominal pain, nausea, vomiting, palpitations, fever, chills, constipation, diarrhea.\par

## 2023-01-12 ENCOUNTER — APPOINTMENT (OUTPATIENT)
Dept: INTERNAL MEDICINE | Facility: CLINIC | Age: 73
End: 2023-01-12
Payer: MEDICARE

## 2023-01-12 VITALS
DIASTOLIC BLOOD PRESSURE: 76 MMHG | TEMPERATURE: 99.1 F | WEIGHT: 190 LBS | HEART RATE: 77 BPM | SYSTOLIC BLOOD PRESSURE: 150 MMHG | BODY MASS INDEX: 32.44 KG/M2 | HEIGHT: 64 IN | OXYGEN SATURATION: 98 %

## 2023-01-12 VITALS
SYSTOLIC BLOOD PRESSURE: 138 MMHG | HEART RATE: 77 BPM | WEIGHT: 190 LBS | DIASTOLIC BLOOD PRESSURE: 76 MMHG | OXYGEN SATURATION: 98 % | BODY MASS INDEX: 32.61 KG/M2 | TEMPERATURE: 99.1 F

## 2023-01-12 VITALS — HEIGHT: 64 IN

## 2023-01-12 LAB
25(OH)D3 SERPL-MCNC: 73.1 NG/ML
ALBUMIN SERPL ELPH-MCNC: 4.8 G/DL
ALP BLD-CCNC: 89 U/L
ALT SERPL-CCNC: 17 U/L
ANION GAP SERPL CALC-SCNC: 15 MMOL/L
AST SERPL-CCNC: 22 U/L
BASOPHILS # BLD AUTO: 0.02 K/UL
BASOPHILS NFR BLD AUTO: 0.2 %
BILIRUB SERPL-MCNC: 0.4 MG/DL
BUN SERPL-MCNC: 22 MG/DL
CALCIUM SERPL-MCNC: 10.4 MG/DL
CHLORIDE SERPL-SCNC: 102 MMOL/L
CHOLEST SERPL-MCNC: 206 MG/DL
CO2 SERPL-SCNC: 25 MMOL/L
CREAT SERPL-MCNC: 1.2 MG/DL
EGFR: 48 ML/MIN/1.73M2
EOSINOPHIL # BLD AUTO: 0.01 K/UL
EOSINOPHIL NFR BLD AUTO: 0.1 %
ESTIMATED AVERAGE GLUCOSE: 126 MG/DL
GLUCOSE SERPL-MCNC: 129 MG/DL
HBA1C MFR BLD HPLC: 6 %
HCT VFR BLD CALC: 43.6 %
HDLC SERPL-MCNC: 45 MG/DL
HGB BLD-MCNC: 14.6 G/DL
IMM GRANULOCYTES NFR BLD AUTO: 0.3 %
LDLC SERPL CALC-MCNC: 121 MG/DL
LYMPHOCYTES # BLD AUTO: 1.62 K/UL
LYMPHOCYTES NFR BLD AUTO: 18 %
MAN DIFF?: NORMAL
MCHC RBC-ENTMCNC: 30.9 PG
MCHC RBC-ENTMCNC: 33.5 GM/DL
MCV RBC AUTO: 92.2 FL
MONOCYTES # BLD AUTO: 0.42 K/UL
MONOCYTES NFR BLD AUTO: 4.7 %
NEUTROPHILS # BLD AUTO: 6.88 K/UL
NEUTROPHILS NFR BLD AUTO: 76.7 %
NONHDLC SERPL-MCNC: 161 MG/DL
PLATELET # BLD AUTO: 293 K/UL
POTASSIUM SERPL-SCNC: 5.5 MMOL/L
PROT SERPL-MCNC: 7.3 G/DL
RBC # BLD: 4.73 M/UL
RBC # FLD: 12.8 %
SODIUM SERPL-SCNC: 143 MMOL/L
TRIGL SERPL-MCNC: 199 MG/DL
TSH SERPL-ACNC: 0.84 UIU/ML
VIT B12 SERPL-MCNC: 868 PG/ML
WBC # FLD AUTO: 8.98 K/UL

## 2023-01-12 PROCEDURE — 99214 OFFICE O/P EST MOD 30 MIN: CPT

## 2023-01-12 RX ORDER — PREDNISONE 20 MG/1
20 TABLET ORAL
Qty: 23 | Refills: 0 | Status: DISCONTINUED | COMMUNITY
Start: 2022-05-11 | End: 2023-01-12

## 2023-01-12 RX ORDER — MULTIVITAMIN
TABLET ORAL DAILY
Refills: 0 | Status: ACTIVE | COMMUNITY

## 2023-01-12 RX ORDER — FOLIC ACID 0.8 MG
500 TABLET ORAL
Refills: 0 | Status: ACTIVE | COMMUNITY

## 2023-01-12 RX ORDER — AMOXICILLIN AND CLAVULANATE POTASSIUM 500; 125 MG/1; MG/1
500-125 TABLET, FILM COATED ORAL 3 TIMES DAILY
Qty: 21 | Refills: 0 | Status: DISCONTINUED | COMMUNITY
Start: 2022-05-11 | End: 2023-01-12

## 2023-01-12 RX ORDER — LATANOPROST/PF 0.005 %
0.01 DROPS OPHTHALMIC (EYE)
Refills: 0 | Status: ACTIVE | COMMUNITY

## 2023-01-12 RX ORDER — VITAMIN B COMPLEX
TABLET ORAL DAILY
Refills: 0 | Status: ACTIVE | COMMUNITY

## 2023-01-12 RX ORDER — ROSUVASTATIN CALCIUM 10 MG/1
10 TABLET, FILM COATED ORAL
Qty: 90 | Refills: 1 | Status: DISCONTINUED | COMMUNITY
Start: 2018-07-20 | End: 2023-01-12

## 2023-01-12 RX ORDER — TURMERIC ROOT EXTRACT 500 MG
TABLET ORAL
Refills: 0 | Status: ACTIVE | COMMUNITY

## 2023-01-12 RX ORDER — AMOXICILLIN AND CLAVULANATE POTASSIUM 875; 125 MG/1; MG/1
875-125 TABLET, COATED ORAL
Qty: 20 | Refills: 0 | Status: DISCONTINUED | COMMUNITY
Start: 2021-08-31 | End: 2023-01-12

## 2023-01-12 NOTE — HISTORY OF PRESENT ILLNESS
[FreeTextEntry1] : FU [de-identified] : RUY GUAMAN is a 72 year F who comes in for a follow up visit.\par Pt notes a hx of abdominal bloating and takes dicyclomine 20 mg as needed which helps with discomfort. She has not seen GI and does not wish to due to insurance only covering certain visits per year for her medical care. \par Pt also notes hx of oral thrush in the past and took Diflucan in the past. She had sinus infection in November and august and had Augmentin abx rx at that time. \par Patient denies any cp, sob,abdominal pain, nausea, vomiting, palpitations, fever, chills, constipation, diarrhea.\par

## 2023-01-12 NOTE — ASSESSMENT
[FreeTextEntry1] : 1.health maintenance: All recent blood work reviewed, repeat BMP due to mildly elevated potassium.  Patient will take extra dose of furosemide.  \par \par 2.COPD with asthma: Recent flare noted and resolved at this time.\par \par 3. oral thrush: Given prescription for Diflucan 150 mg p.o. x1 and clotrimazole oral lozenges.\par \par 4.hyperlipidemia: Not well controlled, increase Crestor to 40 mg once daily, Patient advised on low cholesterol diet-decrease in white carbs and exercise 150 minutes per week.\par \par 5.postnasal drip: Discussed proper use of Flonase nasal spray refilled today and no need for antibiotics at this time.  Treated with antibiotics in August and November.  Discussed antibiotic resistance with patient.\par \par 6.abdominal bloating: Discussed avoiding overeating and to refill dicyclomine to take as needed.\par \par 7.anxiety disorder: Xanax 0.5 mg refilled today. Went over instructions and side effects of medication.\par \par \par

## 2023-02-08 LAB
ANION GAP SERPL CALC-SCNC: 13 MMOL/L
BUN SERPL-MCNC: 27 MG/DL
CALCIUM SERPL-MCNC: 10.1 MG/DL
CHLORIDE SERPL-SCNC: 104 MMOL/L
CO2 SERPL-SCNC: 24 MMOL/L
CREAT SERPL-MCNC: 1.22 MG/DL
EGFR: 47 ML/MIN/1.73M2
GLUCOSE SERPL-MCNC: 101 MG/DL
POTASSIUM SERPL-SCNC: 5.2 MMOL/L
SODIUM SERPL-SCNC: 142 MMOL/L

## 2023-05-10 ENCOUNTER — APPOINTMENT (OUTPATIENT)
Dept: OBGYN | Facility: CLINIC | Age: 73
End: 2023-05-10
Payer: MEDICAID

## 2023-06-07 RX ORDER — CLOTRIMAZOLE 10 MG/1
10 LOZENGE ORAL DAILY
Qty: 50 | Refills: 0 | Status: ACTIVE | COMMUNITY
Start: 2018-11-19 | End: 1900-01-01

## 2023-07-12 ENCOUNTER — APPOINTMENT (OUTPATIENT)
Dept: INTERNAL MEDICINE | Facility: CLINIC | Age: 73
End: 2023-07-12
Payer: MEDICARE

## 2023-07-12 VITALS
HEIGHT: 64 IN | DIASTOLIC BLOOD PRESSURE: 82 MMHG | SYSTOLIC BLOOD PRESSURE: 116 MMHG | OXYGEN SATURATION: 99 % | HEART RATE: 81 BPM | TEMPERATURE: 98 F

## 2023-07-12 VITALS — WEIGHT: 180 LBS | BODY MASS INDEX: 30.9 KG/M2

## 2023-07-12 DIAGNOSIS — H18.513 ENDOTHELIAL CORNEAL DYSTROPHY, BILATERAL: ICD-10-CM

## 2023-07-12 DIAGNOSIS — H43.393 OTHER VITREOUS OPACITIES, BILATERAL: ICD-10-CM

## 2023-07-12 DIAGNOSIS — B37.0 CANDIDAL STOMATITIS: ICD-10-CM

## 2023-07-12 LAB
ALBUMIN SERPL ELPH-MCNC: 4.6 G/DL
ALP BLD-CCNC: 66 U/L
ALT SERPL-CCNC: 19 U/L
ANION GAP SERPL CALC-SCNC: 13 MMOL/L
AST SERPL-CCNC: 22 U/L
BASOPHILS # BLD AUTO: 0.04 K/UL
BASOPHILS NFR BLD AUTO: 0.6 %
BILIRUB SERPL-MCNC: 0.4 MG/DL
BUN SERPL-MCNC: 24 MG/DL
CALCIUM SERPL-MCNC: 9.7 MG/DL
CHLORIDE SERPL-SCNC: 103 MMOL/L
CHOLEST SERPL-MCNC: 168 MG/DL
CO2 SERPL-SCNC: 23 MMOL/L
CREAT SERPL-MCNC: 0.92 MG/DL
EGFR: 67 ML/MIN/1.73M2
EOSINOPHIL # BLD AUTO: 0.05 K/UL
EOSINOPHIL NFR BLD AUTO: 0.7 %
ESTIMATED AVERAGE GLUCOSE: 123 MG/DL
GLUCOSE SERPL-MCNC: 109 MG/DL
HBA1C MFR BLD HPLC: 5.9 %
HCT VFR BLD CALC: 43 %
HDLC SERPL-MCNC: 48 MG/DL
HGB BLD-MCNC: 14.7 G/DL
IMM GRANULOCYTES NFR BLD AUTO: 0.3 %
LDLC SERPL CALC-MCNC: 87 MG/DL
LYMPHOCYTES # BLD AUTO: 1.38 K/UL
LYMPHOCYTES NFR BLD AUTO: 20.1 %
MAN DIFF?: NORMAL
MCHC RBC-ENTMCNC: 32.2 PG
MCHC RBC-ENTMCNC: 34.2 GM/DL
MCV RBC AUTO: 94.1 FL
MONOCYTES # BLD AUTO: 0.35 K/UL
MONOCYTES NFR BLD AUTO: 5.1 %
NEUTROPHILS # BLD AUTO: 5.03 K/UL
NEUTROPHILS NFR BLD AUTO: 73.2 %
NONHDLC SERPL-MCNC: 120 MG/DL
PLATELET # BLD AUTO: 202 K/UL
POTASSIUM SERPL-SCNC: 5 MMOL/L
PROT SERPL-MCNC: 6.7 G/DL
RBC # BLD: 4.57 M/UL
RBC # FLD: 12.5 %
SODIUM SERPL-SCNC: 139 MMOL/L
TRIGL SERPL-MCNC: 165 MG/DL
WBC # FLD AUTO: 6.87 K/UL

## 2023-07-12 PROCEDURE — 99214 OFFICE O/P EST MOD 30 MIN: CPT

## 2023-07-12 RX ORDER — CALCIUM CITRATE/VITAMIN D3 250MG-5MCG
TABLET ORAL
Refills: 0 | Status: DISCONTINUED | COMMUNITY
End: 2023-07-12

## 2023-07-12 RX ORDER — AMOXICILLIN AND CLAVULANATE POTASSIUM 500; 125 MG/1; MG/1
500-125 TABLET, FILM COATED ORAL 3 TIMES DAILY
Qty: 21 | Refills: 0 | Status: DISCONTINUED | COMMUNITY
Start: 2019-06-25 | End: 2023-07-12

## 2023-07-12 RX ORDER — UBIDECARENONE 200 MG
200 CAPSULE ORAL
Refills: 0 | Status: DISCONTINUED | COMMUNITY
End: 2023-07-12

## 2023-07-12 RX ORDER — PREDNISONE 20 MG/1
20 TABLET ORAL DAILY
Qty: 25 | Refills: 0 | Status: DISCONTINUED | COMMUNITY
Start: 2017-06-13 | End: 2023-07-12

## 2023-07-12 RX ORDER — VITAMIN E 200 UNIT
500 CAPSULE ORAL
Refills: 0 | Status: DISCONTINUED | COMMUNITY
End: 2023-07-12

## 2023-07-12 RX ORDER — DICYCLOMINE HYDROCHLORIDE 20 MG/1
20 TABLET ORAL
Qty: 40 | Refills: 1 | Status: DISCONTINUED | COMMUNITY
Start: 2022-07-31 | End: 2023-07-12

## 2023-07-12 RX ORDER — NYSTATIN 100000 [USP'U]/ML
100000 SUSPENSION ORAL
Qty: 1 | Refills: 0 | Status: ACTIVE | COMMUNITY
Start: 1900-01-01 | End: 1900-01-01

## 2023-07-12 NOTE — ASSESSMENT
[FreeTextEntry1] : 1.health maintenance: All recent blood work reviewed. Per pt she did FOBT stool test last year with insurance which was negative last year. does not\par \par 2.COPD with asthma: Recent flare noted and resolved at this time. she will call back regarding abx given in ED that helped her. \par \par 3. oral thrush: notes Diflucan and clotrimazole lozenges didn't help. She will call back with liquid rx given from ED. \par \par 4.hyperlipidemia: better controlled, continue on higher dose Crestor to 40 mg once daily, Patient advised on low cholesterol diet-decrease in white carbs and exercise 150 minutes per week.\par \par 5.postnasal drip: continue with Flonase nasal spray.\par \par 6.anxiety disorder: Xanax 0.5 mg refilled today. Went over instructions and side effects of medication.\par \par \par

## 2023-07-12 NOTE — HISTORY OF PRESENT ILLNESS
[FreeTextEntry1] : FU [de-identified] : RUY GUAMAN is a 72 year F who comes in for a follow up visit.\par Pt feels tingling and discomfort of her throat x 1 month. \par She notes the Augmentin and Diflucan did mild help with symptoms last month. \par Pt had labs done today which we reviewed today. 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 Pt does not need MCA per patient. \par Patient denies any cp, sob,abdominal pain, nausea, vomiting, palpitations, fever, chills, constipation, diarrhea.\par

## 2023-10-04 ENCOUNTER — RX RENEWAL (OUTPATIENT)
Age: 73
End: 2023-10-04

## 2023-10-04 DIAGNOSIS — N95.2 POSTMENOPAUSAL ATROPHIC VAGINITIS: ICD-10-CM

## 2023-10-09 ENCOUNTER — RX RENEWAL (OUTPATIENT)
Age: 73
End: 2023-10-09

## 2023-10-11 ENCOUNTER — APPOINTMENT (OUTPATIENT)
Dept: OBGYN | Facility: CLINIC | Age: 73
End: 2023-10-11
Payer: MEDICAID

## 2023-10-11 VITALS
WEIGHT: 180 LBS | DIASTOLIC BLOOD PRESSURE: 98 MMHG | HEIGHT: 64 IN | HEART RATE: 83 BPM | BODY MASS INDEX: 30.73 KG/M2 | SYSTOLIC BLOOD PRESSURE: 177 MMHG

## 2023-10-11 LAB
BILIRUB UR QL STRIP: NORMAL
CLARITY UR: CLEAR
COLLECTION METHOD: NORMAL
GLUCOSE UR-MCNC: NORMAL
HCG UR QL: 0 EU/DL
HEMOGLOBIN: 14.1
HGB UR QL STRIP.AUTO: NORMAL
KETONES UR-MCNC: NORMAL
LEUKOCYTE ESTERASE UR QL STRIP: NORMAL
NITRITE UR QL STRIP: NORMAL
PH UR STRIP: 5
PROT UR STRIP-MCNC: NORMAL
SP GR UR STRIP: 1

## 2023-10-11 PROCEDURE — 99387 INIT PM E/M NEW PAT 65+ YRS: CPT

## 2023-10-11 PROCEDURE — 81003 URINALYSIS AUTO W/O SCOPE: CPT | Mod: QW

## 2023-10-11 PROCEDURE — 85018 HEMOGLOBIN: CPT | Mod: QW

## 2023-10-16 ENCOUNTER — NON-APPOINTMENT (OUTPATIENT)
Age: 73
End: 2023-10-16

## 2023-10-16 LAB — CYTOLOGY CVX/VAG DOC THIN PREP: NORMAL

## 2023-11-14 ENCOUNTER — RX RENEWAL (OUTPATIENT)
Age: 73
End: 2023-11-14

## 2023-12-22 ENCOUNTER — RX RENEWAL (OUTPATIENT)
Age: 73
End: 2023-12-22

## 2024-01-09 ENCOUNTER — RX RENEWAL (OUTPATIENT)
Age: 74
End: 2024-01-09

## 2024-02-12 ENCOUNTER — APPOINTMENT (OUTPATIENT)
Dept: INTERNAL MEDICINE | Facility: CLINIC | Age: 74
End: 2024-02-12
Payer: MEDICARE

## 2024-02-12 VITALS
TEMPERATURE: 98.3 F | SYSTOLIC BLOOD PRESSURE: 140 MMHG | DIASTOLIC BLOOD PRESSURE: 62 MMHG | HEIGHT: 64 IN | OXYGEN SATURATION: 98 % | HEART RATE: 76 BPM

## 2024-02-12 DIAGNOSIS — J32.9 CHRONIC SINUSITIS, UNSPECIFIED: ICD-10-CM

## 2024-02-12 PROCEDURE — 99213 OFFICE O/P EST LOW 20 MIN: CPT

## 2024-02-12 RX ORDER — CLOTRIMAZOLE 10 MG/1
10 LOZENGE ORAL DAILY
Qty: 35 | Refills: 0 | Status: ACTIVE | COMMUNITY
Start: 2024-02-12 | End: 1900-01-01

## 2024-02-12 RX ORDER — FLUCONAZOLE 150 MG/1
150 TABLET ORAL
Qty: 1 | Refills: 1 | Status: ACTIVE | COMMUNITY
Start: 2021-09-09 | End: 1900-01-01

## 2024-02-12 NOTE — PHYSICAL EXAM
[No Acute Distress] : no acute distress [Normal Sclera/Conjunctiva] : normal sclera/conjunctiva [Normal Outer Ear/Nose] : the outer ears and nose were normal in appearance [No Respiratory Distress] : no respiratory distress  [Clear to Auscultation] : lungs were clear to auscultation bilaterally [Normal Rate] : normal rate  [Regular Rhythm] : with a regular rhythm [Normal S1, S2] : normal S1 and S2 [Soft] : abdomen soft [Non Tender] : non-tender [Non-distended] : non-distended [Normal Anterior Cervical Nodes] : no anterior cervical lymphadenopathy [No Rash] : no rash [No Focal Deficits] : no focal deficits [Normal Gait] : normal gait [Normal Affect] : the affect was normal [Normal Insight/Judgement] : insight and judgment were intact [de-identified] : oropharyngeal erythema, was in ear, hazy TM [de-identified] : coarse breath sounds

## 2024-02-12 NOTE — REVIEW OF SYSTEMS
[Fever] : no fever [Chills] : no chills [Fatigue] : fatigue [Discharge] : no discharge [Earache] : no earache [Sore Throat] : sore throat [Postnasal Drip] : postnasal drip [Chest Pain] : no chest pain [Palpitations] : no palpitations [Leg Claudication] : no leg claudication [Shortness Of Breath] : shortness of breath [Cough] : no cough [Abdominal Pain] : no abdominal pain [Vomiting] : no vomiting [Dysuria] : no dysuria [Hematuria] : no hematuria [Joint Pain] : no joint pain [Muscle Pain] : no muscle pain [Itching] : no itching [Skin Rash] : no skin rash [Headache] : no headache [Suicidal] : not suicidal

## 2024-02-12 NOTE — HISTORY OF PRESENT ILLNESS
[FreeTextEntry1] : acute   [de-identified] : 72 y/o F PMhx copd, anxiety, HLD here for acute visit  Reports has been feeling more congested x 2 days, more SOB than baseline, feel having a flare of her copd, report ear fullness and pain, sinus pressure and pain. Denies fever chills cp, abdominal pain

## 2024-02-13 ENCOUNTER — APPOINTMENT (OUTPATIENT)
Dept: INTERNAL MEDICINE | Facility: CLINIC | Age: 74
End: 2024-02-13

## 2024-02-14 ENCOUNTER — RX RENEWAL (OUTPATIENT)
Age: 74
End: 2024-02-14

## 2024-03-14 ENCOUNTER — APPOINTMENT (OUTPATIENT)
Dept: INTERNAL MEDICINE | Facility: CLINIC | Age: 74
End: 2024-03-14
Payer: MEDICARE

## 2024-03-14 ENCOUNTER — NON-APPOINTMENT (OUTPATIENT)
Age: 74
End: 2024-03-14

## 2024-03-14 VITALS
HEART RATE: 77 BPM | SYSTOLIC BLOOD PRESSURE: 138 MMHG | HEIGHT: 64 IN | DIASTOLIC BLOOD PRESSURE: 80 MMHG | OXYGEN SATURATION: 99 % | TEMPERATURE: 98.6 F | WEIGHT: 190 LBS | BODY MASS INDEX: 32.44 KG/M2

## 2024-03-14 DIAGNOSIS — E78.5 HYPERLIPIDEMIA, UNSPECIFIED: ICD-10-CM

## 2024-03-14 DIAGNOSIS — E66.9 OBESITY, UNSPECIFIED: ICD-10-CM

## 2024-03-14 DIAGNOSIS — I10 ESSENTIAL (PRIMARY) HYPERTENSION: ICD-10-CM

## 2024-03-14 DIAGNOSIS — E78.00 PURE HYPERCHOLESTEROLEMIA, UNSPECIFIED: ICD-10-CM

## 2024-03-14 DIAGNOSIS — N30.10 INTERSTITIAL CYSTITIS (CHRONIC) W/OUT HEMATURIA: ICD-10-CM

## 2024-03-14 DIAGNOSIS — R39.9 UNSPECIFIED SYMPTOMS AND SIGNS INVOLVING THE GENITOURINARY SYSTEM: ICD-10-CM

## 2024-03-14 DIAGNOSIS — J44.89 OTHER SPECIFIED CHRONIC OBSTRUCTIVE PULMONARY DISEASE: ICD-10-CM

## 2024-03-14 DIAGNOSIS — J32.9 CHRONIC SINUSITIS, UNSPECIFIED: ICD-10-CM

## 2024-03-14 DIAGNOSIS — Z00.00 ENCOUNTER FOR GENERAL ADULT MEDICAL EXAMINATION W/OUT ABNORMAL FINDINGS: ICD-10-CM

## 2024-03-14 DIAGNOSIS — R73.03 PREDIABETES.: ICD-10-CM

## 2024-03-14 LAB
25(OH)D3 SERPL-MCNC: 92.6 NG/ML
ALBUMIN SERPL ELPH-MCNC: 4.9 G/DL
ALP BLD-CCNC: 79 U/L
ALT SERPL-CCNC: 19 U/L
ANION GAP SERPL CALC-SCNC: 12 MMOL/L
APPEARANCE: CLEAR
AST SERPL-CCNC: 21 U/L
BACTERIA: ABNORMAL /HPF
BILIRUB SERPL-MCNC: 0.4 MG/DL
BILIRUBIN URINE: NEGATIVE
BLOOD URINE: NEGATIVE
BUN SERPL-MCNC: 21 MG/DL
CALCIUM SERPL-MCNC: 10.4 MG/DL
CAST: 2 /LPF
CHLORIDE SERPL-SCNC: 101 MMOL/L
CHOLEST SERPL-MCNC: 183 MG/DL
CO2 SERPL-SCNC: 29 MMOL/L
COLOR: YELLOW
CREAT SERPL-MCNC: 1.11 MG/DL
EGFR: 53 ML/MIN/1.73M2
EPITHELIAL CELLS: 13 /HPF
ESTIMATED AVERAGE GLUCOSE: 126 MG/DL
GLUCOSE QUALITATIVE U: NEGATIVE MG/DL
GLUCOSE SERPL-MCNC: 110 MG/DL
HBA1C MFR BLD HPLC: 6 %
HCV AB SER QL: NONREACTIVE
HCV S/CO RATIO: 0.11 S/CO
HDLC SERPL-MCNC: 50 MG/DL
KETONES URINE: NEGATIVE MG/DL
LDLC SERPL CALC-MCNC: 99 MG/DL
LEUKOCYTE ESTERASE URINE: ABNORMAL
MICROSCOPIC-UA: NORMAL
NITRITE URINE: NEGATIVE
NONHDLC SERPL-MCNC: 133 MG/DL
PH URINE: 6
POTASSIUM SERPL-SCNC: 5 MMOL/L
PROT SERPL-MCNC: 7.1 G/DL
PROTEIN URINE: NEGATIVE MG/DL
RED BLOOD CELLS URINE: 1 /HPF
SODIUM SERPL-SCNC: 143 MMOL/L
SPECIFIC GRAVITY URINE: 1.01
TRIGL SERPL-MCNC: 200 MG/DL
TSH SERPL-ACNC: 3.98 UIU/ML
UROBILINOGEN URINE: 0.2 MG/DL
WHITE BLOOD CELLS URINE: 4 /HPF

## 2024-03-14 PROCEDURE — 93000 ELECTROCARDIOGRAM COMPLETE: CPT

## 2024-03-14 PROCEDURE — G0439: CPT

## 2024-03-14 RX ORDER — SULFAMETHOXAZOLE AND TRIMETHOPRIM 800; 160 MG/1; MG/1
800-160 TABLET ORAL
Refills: 0 | Status: DISCONTINUED | COMMUNITY
End: 2024-03-14

## 2024-03-14 RX ORDER — ALBUTEROL SULFATE 2.5 MG/3ML
(2.5 MG/3ML) SOLUTION RESPIRATORY (INHALATION)
Qty: 2 | Refills: 5 | Status: DISCONTINUED | COMMUNITY
End: 2024-03-14

## 2024-03-14 RX ORDER — PREDNISONE 20 MG/1
20 TABLET ORAL 3 TIMES DAILY
Qty: 15 | Refills: 0 | Status: DISCONTINUED | COMMUNITY
Start: 2024-02-12 | End: 2024-03-14

## 2024-03-14 RX ORDER — ALPRAZOLAM 0.5 MG/1
0.5 TABLET ORAL
Qty: 90 | Refills: 0 | Status: ACTIVE | COMMUNITY
Start: 2017-05-03 | End: 1900-01-01

## 2024-03-14 RX ORDER — AMOXICILLIN AND CLAVULANATE POTASSIUM 500; 125 MG/1; MG/1
500-125 TABLET, FILM COATED ORAL 3 TIMES DAILY
Qty: 15 | Refills: 0 | Status: DISCONTINUED | COMMUNITY
Start: 2024-02-12 | End: 2024-03-14

## 2024-03-14 RX ORDER — PREDNISONE 20 MG/1
20 TABLET ORAL
Qty: 16 | Refills: 0 | Status: ACTIVE | COMMUNITY
Start: 2019-06-25 | End: 1900-01-01

## 2024-03-14 RX ORDER — AMOXICILLIN 500 MG/1
500 TABLET, FILM COATED ORAL 3 TIMES DAILY
Qty: 21 | Refills: 0 | Status: ACTIVE | COMMUNITY
Start: 2024-03-14 | End: 1900-01-01

## 2024-03-15 NOTE — HISTORY OF PRESENT ILLNESS
[FreeTextEntry1] : CPE [de-identified] : RUY GUAMAN is a 73 year F who comes in for an annual physical exam. Pt with hx of allergic rhinitis, abdominal bloating, anxiety disorder, candidiasis oral, COPD with asthma, GERD, HLD, HTN, Obesity, Prediabetes, osteopenia. Pt seen last month for COPD and sinusitis and tx with prednisone and Augmentin and felt minimally better, has cough this time on/off.  Pt notes cough with yellow phlegm, going on for the last 1 month.  Pt had labs today which we reviewed.  Patient denies any cp, sob, abdominal pain, nausea, vomiting, palpitations, fever, chills, constipation, diarrhea.

## 2024-03-15 NOTE — ASSESSMENT
[FreeTextEntry1] : 1.cough with sinusitis.  With history of COPD.  Discussed restarting back on prednisone and amoxicillin. Went over instructions and side effects of medication. Increase fluids, rest.  Take Tylenol 500 mg 1-2 tabs as needed every 8 hours for pain.  Call for new or worsening symptoms.  2. Health Maintenance: Patient counseled regarding recommendations for vaccines, seat belt safety, distracted driving, diet and exercise and all preventative screening. Patient will follow up with Gyn for mammogram, pap smear and Bone Density. Patient will follow up with GI for colonoscopy. Discussed blood work to obtain.  3.hypertriglyceridemia: Recheck lipid panel continue on fenofibrate and Crestor. Patient advised on low cholesterol diet-decrease in white carbs and exercise 150 minutes per week.  4.history of anxiety: Patient request for Xanax 0.5 mg refilled today. Went over instructions and side effects of medication.  5.history of candidiasis: Continue on clotrimazole as needed.  6.GERD: Continue on famotidine and omeprazole.

## 2024-03-15 NOTE — HEALTH RISK ASSESSMENT
[No] : In the past 12 months have you used drugs other than those required for medical reasons? No [Several Days (1)] : 3.) Trouble falling asleep, or sleeping too much? Several days [Not at All (0)] : 8.) Moving or speaking so slowly that other people could have noticed, or the opposite, moving or speaking faster than usual? Not at all [Not at all] : How difficult have these problems made it for you to do your work, take care of things at home, or get along with people? Not at all [Never] : Never [1] : 2) Feeling down, depressed, or hopeless for several days (1) [0] : 1) Little interest or pleasure doing things: Not at all (0) [PHQ-2 Negative - No further assessment needed] : PHQ-2 Negative - No further assessment needed [PHQ-9 Negative - No further assessment needed] : PHQ-9 Negative - No further assessment needed [I have developed a follow-up plan documented below in the note.] : I have developed a follow-up plan documented below in the note. [Patient reported mammogram was normal] : Patient reported mammogram was normal [Patient reported PAP Smear was normal] : Patient reported PAP Smear was normal [Patient reported bone density results were abnormal] : Patient reported bone density results were abnormal [Patient reported colonoscopy was normal] : Patient reported colonoscopy was normal [[id number]] : 2 [ELA6Psogj] : 1 [EyeExamDate] : 01/2024 [MammogramDate] : 5/10/23 [MammogramComments] :  [PapSmearComments] :  [PapSmearDate] : 10/11/23 [BoneDensityDate] : 4/21/21 [BoneDensityComments] : osteopenia [ColonoscopyDate] : 2020

## 2024-03-24 LAB — BACTERIA UR CULT: NORMAL

## 2024-04-04 ENCOUNTER — RX RENEWAL (OUTPATIENT)
Age: 74
End: 2024-04-04

## 2024-04-04 RX ORDER — OMEPRAZOLE 20 MG/1
20 CAPSULE, DELAYED RELEASE ORAL
Qty: 90 | Refills: 0 | Status: ACTIVE | COMMUNITY
Start: 2016-09-13 | End: 1900-01-01

## 2024-04-11 ENCOUNTER — RX RENEWAL (OUTPATIENT)
Age: 74
End: 2024-04-11

## 2024-04-11 RX ORDER — ROSUVASTATIN CALCIUM 40 MG/1
40 TABLET, FILM COATED ORAL DAILY
Qty: 90 | Refills: 0 | Status: ACTIVE | COMMUNITY
Start: 2018-07-19 | End: 1900-01-01

## 2024-05-07 ENCOUNTER — RX RENEWAL (OUTPATIENT)
Age: 74
End: 2024-05-07

## 2024-05-07 RX ORDER — ALBUTEROL SULFATE 90 UG/1
108 (90 BASE) INHALANT RESPIRATORY (INHALATION)
Qty: 42.5 | Refills: 0 | Status: ACTIVE | COMMUNITY
Start: 2019-11-06 | End: 1900-01-01

## 2024-05-15 RX ORDER — FLUTICASONE PROPIONATE 50 UG/1
50 SPRAY, METERED NASAL
Qty: 48 | Refills: 0 | Status: ACTIVE | COMMUNITY
Start: 2019-05-20 | End: 1900-01-01

## 2024-05-16 ENCOUNTER — RX RENEWAL (OUTPATIENT)
Age: 74
End: 2024-05-16

## 2024-05-16 RX ORDER — FENOFIBRATE 145 MG/1
145 TABLET, COATED ORAL
Qty: 90 | Refills: 0 | Status: ACTIVE | COMMUNITY
Start: 2016-11-28 | End: 1900-01-01

## 2024-06-19 RX ORDER — FAMOTIDINE 20 MG/1
20 TABLET, FILM COATED ORAL
Qty: 90 | Refills: 0 | Status: ACTIVE | COMMUNITY
Start: 2021-12-14 | End: 1900-01-01

## 2024-08-02 DIAGNOSIS — R73.03 PREDIABETES.: ICD-10-CM

## 2024-08-02 DIAGNOSIS — E67.3 HYPERVITAMINOSIS D: ICD-10-CM

## 2024-08-06 ENCOUNTER — RX RENEWAL (OUTPATIENT)
Age: 74
End: 2024-08-06

## 2024-08-25 ENCOUNTER — NON-APPOINTMENT (OUTPATIENT)
Age: 74
End: 2024-08-25

## 2024-08-30 ENCOUNTER — APPOINTMENT (OUTPATIENT)
Dept: FAMILY MEDICINE | Facility: CLINIC | Age: 74
End: 2024-08-30
Payer: MEDICARE

## 2024-08-30 VITALS
BODY MASS INDEX: 30.39 KG/M2 | DIASTOLIC BLOOD PRESSURE: 80 MMHG | HEIGHT: 64 IN | OXYGEN SATURATION: 97 % | TEMPERATURE: 98.6 F | SYSTOLIC BLOOD PRESSURE: 146 MMHG | HEART RATE: 76 BPM | WEIGHT: 178 LBS

## 2024-08-30 PROCEDURE — 99204 OFFICE O/P NEW MOD 45 MIN: CPT

## 2024-08-30 RX ORDER — PREDNISONE 20 MG/1
20 TABLET ORAL
Qty: 24 | Refills: 0 | Status: ACTIVE | COMMUNITY
Start: 2024-08-30 | End: 1900-01-01

## 2024-08-30 NOTE — PHYSICAL EXAM
[Normal] : no acute distress, well nourished, well developed and well-appearing [de-identified] : expirartory wheeze RUL

## 2024-08-30 NOTE — HISTORY OF PRESENT ILLNESS
[FreeTextEntry8] : Acute care visit PCP Dr Urbina  Reports asthma/COPD flair that started 1 week ago. She waited 3 days before being seen at Urgent care.  She reports wheezing, cough, sob, feels a tight knot in her right mid back.  She was negative for COVID and RSV. She was prescribed Prednisone 40mg daily.  She has taken in for 5 days without too much improvement.  She is also taking ALbuterol inhaler and neb around the clock. She states in the past she has done well with starting Prednisone taper at 60mg. She is not on a maintenance inhaler for COPD

## 2024-09-04 ENCOUNTER — APPOINTMENT (OUTPATIENT)
Dept: INTERNAL MEDICINE | Facility: CLINIC | Age: 74
End: 2024-09-04
Payer: MEDICARE

## 2024-09-04 VITALS
DIASTOLIC BLOOD PRESSURE: 74 MMHG | OXYGEN SATURATION: 99 % | HEIGHT: 64 IN | HEART RATE: 54 BPM | WEIGHT: 178 LBS | SYSTOLIC BLOOD PRESSURE: 150 MMHG | BODY MASS INDEX: 30.39 KG/M2 | TEMPERATURE: 98.1 F

## 2024-09-04 VITALS — DIASTOLIC BLOOD PRESSURE: 80 MMHG | SYSTOLIC BLOOD PRESSURE: 139 MMHG

## 2024-09-04 DIAGNOSIS — I10 ESSENTIAL (PRIMARY) HYPERTENSION: ICD-10-CM

## 2024-09-04 DIAGNOSIS — E78.00 PURE HYPERCHOLESTEROLEMIA, UNSPECIFIED: ICD-10-CM

## 2024-09-04 DIAGNOSIS — E78.1 PURE HYPERGLYCERIDEMIA: ICD-10-CM

## 2024-09-04 DIAGNOSIS — J30.9 ALLERGIC RHINITIS, UNSPECIFIED: ICD-10-CM

## 2024-09-04 DIAGNOSIS — J44.89 OTHER SPECIFIED CHRONIC OBSTRUCTIVE PULMONARY DISEASE: ICD-10-CM

## 2024-09-04 PROCEDURE — G2211 COMPLEX E/M VISIT ADD ON: CPT

## 2024-09-04 PROCEDURE — 99214 OFFICE O/P EST MOD 30 MIN: CPT

## 2024-09-04 RX ORDER — BRIMONIDINE/DORZOLAMIDE HCL 0.1 %-2 %
DROPS OPHTHALMIC (EYE)
Refills: 0 | Status: ACTIVE | COMMUNITY

## 2024-09-04 RX ORDER — LORATADINE 10 MG/1
10 TABLET ORAL DAILY
Qty: 30 | Refills: 3 | Status: ACTIVE | COMMUNITY
Start: 2024-09-04 | End: 1900-01-01

## 2024-09-04 RX ORDER — AMOXICILLIN AND CLAVULANATE POTASSIUM 875; 125 MG/1; MG/1
875-125 TABLET, COATED ORAL
Qty: 14 | Refills: 0 | Status: ACTIVE | COMMUNITY
Start: 2024-09-04 | End: 1900-01-01

## 2024-09-04 NOTE — ASSESSMENT
[FreeTextEntry1] : 1.COPD exacerbation: Continue prednisone taper per prior visit and restart back on Augmentin for another 7 days.  Given Diflucan in case of oral thrush. Went over instructions and side effects of medication. Call for new or worsening symptoms.  2.hypertriglyceridemia: Recent labs: Reviewed, doing well on fenofibrate and Crestor. Patient advised on low cholesterol diet-decrease in white carbs and exercise 150 minutes per week.  3.history of anxiety: Patient request for Xanax 0.5 mg refilled today.  [place] reviewed.  Went over instructions and side effects of medication.  4.high blood pressure: Does not wish to be on medication at this time and understands the risks, will continue monitoring blood pressure at home.

## 2024-09-04 NOTE — HISTORY OF PRESENT ILLNESS
[FreeTextEntry1] :  Follow Up Visit [de-identified] : RUY GUAMAN is a 73 year F who comes in for a follow up visit. Pt notes symptoms of cough, wheezing and sob that began 7-10 days ago and went to UC 8/26/24 and covid test negative and started on Augmentin and Prednisone. Pt still with continued symptoms and saw  on 8/30/24 and given extended Prednisone taper from 60 mg and is currently for 40 mg. She has been using albuterol inhaler as needed and nebulizer treatments as well. Pt notes she continues to feel congestion in chest.  Patient denies any cp, sob, abdominal pain, nausea, vomiting, palpitations, fever, chills, constipation, diarrhea.

## 2024-09-04 NOTE — PHYSICAL EXAM
[TextEntry] : General: NAD HEENT: NC/AT, EOMI, PERRLA. Neck: supple, no JVD. no LAD. CVS: S1, S2 normal, RRR, no m/g/r Resp: CTA b/l, b/l wheezing Extremities: no edema Neuro: aaox3

## 2024-09-17 RX ORDER — FAMOTIDINE 20 MG/1
20 TABLET, FILM COATED ORAL DAILY
Qty: 90 | Refills: 1 | Status: ACTIVE | COMMUNITY
Start: 1900-01-01 | End: 1900-01-01

## 2024-09-27 ENCOUNTER — RX RENEWAL (OUTPATIENT)
Age: 74
End: 2024-09-27

## 2024-10-11 ENCOUNTER — NON-APPOINTMENT (OUTPATIENT)
Age: 74
End: 2024-10-11

## 2024-10-11 PROBLEM — Z01.419 ENCOUNTER FOR ANNUAL ROUTINE GYNECOLOGICAL EXAMINATION: Status: RESOLVED | Noted: 2024-10-11 | Resolved: 2024-10-11

## 2024-10-11 PROBLEM — Z12.4 CERVICAL CANCER SCREENING: Status: RESOLVED | Noted: 2024-10-11 | Resolved: 2024-10-11

## 2024-10-11 PROBLEM — Z01.419 ENCOUNTER FOR ANNUAL ROUTINE GYNECOLOGICAL EXAMINATION: Status: ACTIVE | Noted: 2024-10-11

## 2024-10-11 PROBLEM — Z12.11 COLON CANCER SCREENING: Status: ACTIVE | Noted: 2024-10-11

## 2024-10-11 PROBLEM — Z12.4 CERVICAL CANCER SCREENING: Status: ACTIVE | Noted: 2024-10-11

## 2024-10-18 ENCOUNTER — APPOINTMENT (OUTPATIENT)
Dept: OBGYN | Facility: CLINIC | Age: 74
End: 2024-10-18

## 2024-10-18 DIAGNOSIS — Z01.419 ENCOUNTER FOR GYNECOLOGICAL EXAMINATION (GENERAL) (ROUTINE) W/OUT ABNORMAL FINDINGS: ICD-10-CM

## 2024-10-18 DIAGNOSIS — Z12.11 ENCOUNTER FOR SCREENING FOR MALIGNANT NEOPLASM OF COLON: ICD-10-CM

## 2024-10-18 DIAGNOSIS — Z12.4 ENCOUNTER FOR SCREENING FOR MALIGNANT NEOPLASM OF CERVIX: ICD-10-CM

## 2025-01-17 DIAGNOSIS — Z12.39 ENCOUNTER FOR OTHER SCREENING FOR MALIGNANT NEOPLASM OF BREAST: ICD-10-CM

## 2025-02-27 ENCOUNTER — RX RENEWAL (OUTPATIENT)
Age: 75
End: 2025-02-27

## 2025-03-04 ENCOUNTER — APPOINTMENT (OUTPATIENT)
Dept: OBGYN | Facility: CLINIC | Age: 75
End: 2025-03-04
Payer: MEDICARE

## 2025-03-04 VITALS — HEIGHT: 65 IN | WEIGHT: 178 LBS | BODY MASS INDEX: 29.66 KG/M2

## 2025-03-04 VITALS — DIASTOLIC BLOOD PRESSURE: 85 MMHG | SYSTOLIC BLOOD PRESSURE: 134 MMHG | HEART RATE: 69 BPM

## 2025-03-04 DIAGNOSIS — Z12.39 ENCOUNTER FOR OTHER SCREENING FOR MALIGNANT NEOPLASM OF BREAST: ICD-10-CM

## 2025-03-04 DIAGNOSIS — N95.2 POSTMENOPAUSAL ATROPHIC VAGINITIS: ICD-10-CM

## 2025-03-04 DIAGNOSIS — Z01.419 ENCOUNTER FOR GYNECOLOGICAL EXAMINATION (GENERAL) (ROUTINE) W/OUT ABNORMAL FINDINGS: ICD-10-CM

## 2025-03-04 DIAGNOSIS — Z13.820 ENCOUNTER FOR SCREENING FOR OSTEOPOROSIS: ICD-10-CM

## 2025-03-04 DIAGNOSIS — Z12.4 ENCOUNTER FOR SCREENING FOR MALIGNANT NEOPLASM OF CERVIX: ICD-10-CM

## 2025-03-04 DIAGNOSIS — R82.90 UNSPECIFIED ABNORMAL FINDINGS IN URINE: ICD-10-CM

## 2025-03-04 DIAGNOSIS — R32 UNSPECIFIED URINARY INCONTINENCE: ICD-10-CM

## 2025-03-04 LAB
BILIRUB UR QL STRIP: NEGATIVE
CLARITY UR: NORMAL
COLLECTION METHOD: NORMAL
GLUCOSE UR-MCNC: NEGATIVE
HCG UR QL: 0 EU/DL
HEMOGLOBIN: 15.2
HGB UR QL STRIP.AUTO: NORMAL
KETONES UR-MCNC: NEGATIVE
LEUKOCYTE ESTERASE UR QL STRIP: NORMAL
NITRITE UR QL STRIP: POSITIVE
PH UR STRIP: 5
PROT UR STRIP-MCNC: NEGATIVE
SP GR UR STRIP: 1

## 2025-03-04 PROCEDURE — 81003 URINALYSIS AUTO W/O SCOPE: CPT | Mod: QW

## 2025-03-04 PROCEDURE — 85018 HEMOGLOBIN: CPT | Mod: QW

## 2025-03-04 PROCEDURE — G0101: CPT

## 2025-03-07 LAB — BACTERIA UR CULT: ABNORMAL

## 2025-03-07 RX ORDER — NITROFURANTOIN MACROCRYSTALS 100 MG/1
100 CAPSULE ORAL
Qty: 10 | Refills: 0 | Status: DISCONTINUED | COMMUNITY
Start: 2025-03-07 | End: 2025-03-07

## 2025-03-09 RX ORDER — SULFAMETHOXAZOLE AND TRIMETHOPRIM 800; 160 MG/1; MG/1
800-160 TABLET ORAL TWICE DAILY
Qty: 6 | Refills: 0 | Status: ACTIVE | COMMUNITY
Start: 2025-03-07

## 2025-03-10 DIAGNOSIS — N39.0 URINARY TRACT INFECTION, SITE NOT SPECIFIED: ICD-10-CM

## 2025-03-10 RX ORDER — CEPHALEXIN 250 MG/1
250 TABLET ORAL
Qty: 14 | Refills: 0 | Status: ACTIVE | COMMUNITY
Start: 2025-03-10

## 2025-03-13 LAB
25(OH)D3 SERPL-MCNC: 93.9 NG/ML
ALBUMIN SERPL ELPH-MCNC: 4.6 G/DL
ALP BLD-CCNC: 77 U/L
ALT SERPL-CCNC: 21 U/L
ANION GAP SERPL CALC-SCNC: 14 MMOL/L
AST SERPL-CCNC: 27 U/L
BILIRUB SERPL-MCNC: 0.4 MG/DL
BUN SERPL-MCNC: 17 MG/DL
CALCIUM SERPL-MCNC: 10 MG/DL
CHLORIDE SERPL-SCNC: 104 MMOL/L
CHOLEST SERPL-MCNC: 138 MG/DL
CO2 SERPL-SCNC: 23 MMOL/L
CREAT SERPL-MCNC: 1.09 MG/DL
EGFRCR SERPLBLD CKD-EPI 2021: 54 ML/MIN/1.73M2
ESTIMATED AVERAGE GLUCOSE: 120 MG/DL
GLUCOSE SERPL-MCNC: 105 MG/DL
HBA1C MFR BLD HPLC: 5.8 %
HCT VFR BLD CALC: 42.2 %
HDLC SERPL-MCNC: 38 MG/DL
HGB BLD-MCNC: 13.9 G/DL
LDLC SERPL CALC-MCNC: 70 MG/DL
MCHC RBC-ENTMCNC: 30.7 PG
MCHC RBC-ENTMCNC: 32.9 G/DL
MCV RBC AUTO: 93.2 FL
NONHDLC SERPL-MCNC: 99 MG/DL
PLATELET # BLD AUTO: 231 K/UL
POTASSIUM SERPL-SCNC: 4.9 MMOL/L
PROT SERPL-MCNC: 6.6 G/DL
RBC # BLD: 4.53 M/UL
RBC # FLD: 13.1 %
SODIUM SERPL-SCNC: 142 MMOL/L
TRIGL SERPL-MCNC: 172 MG/DL
TSH SERPL-ACNC: 2.22 UIU/ML
WBC # FLD AUTO: 4.47 K/UL

## 2025-03-14 LAB
HCV AB SER QL: NONREACTIVE
HCV S/CO RATIO: 0.15 S/CO

## 2025-03-19 ENCOUNTER — APPOINTMENT (OUTPATIENT)
Dept: INTERNAL MEDICINE | Facility: CLINIC | Age: 75
End: 2025-03-19
Payer: MEDICARE

## 2025-03-19 ENCOUNTER — NON-APPOINTMENT (OUTPATIENT)
Age: 75
End: 2025-03-19

## 2025-03-19 VITALS
DIASTOLIC BLOOD PRESSURE: 70 MMHG | WEIGHT: 178 LBS | SYSTOLIC BLOOD PRESSURE: 130 MMHG | TEMPERATURE: 98 F | BODY MASS INDEX: 29.66 KG/M2 | HEIGHT: 65 IN | OXYGEN SATURATION: 99 % | HEART RATE: 75 BPM

## 2025-03-19 DIAGNOSIS — M85.80 OTHER SPECIFIED DISORDERS OF BONE DENSITY AND STRUCTURE, UNSPECIFIED SITE: ICD-10-CM

## 2025-03-19 DIAGNOSIS — E78.5 HYPERLIPIDEMIA, UNSPECIFIED: ICD-10-CM

## 2025-03-19 DIAGNOSIS — I10 ESSENTIAL (PRIMARY) HYPERTENSION: ICD-10-CM

## 2025-03-19 DIAGNOSIS — F41.9 ANXIETY DISORDER, UNSPECIFIED: ICD-10-CM

## 2025-03-19 DIAGNOSIS — E78.1 PURE HYPERGLYCERIDEMIA: ICD-10-CM

## 2025-03-19 DIAGNOSIS — Z00.00 ENCOUNTER FOR GENERAL ADULT MEDICAL EXAMINATION W/OUT ABNORMAL FINDINGS: ICD-10-CM

## 2025-03-19 DIAGNOSIS — J44.89 OTHER SPECIFIED CHRONIC OBSTRUCTIVE PULMONARY DISEASE: ICD-10-CM

## 2025-03-19 PROCEDURE — G0439: CPT

## 2025-03-19 PROCEDURE — 93000 ELECTROCARDIOGRAM COMPLETE: CPT

## 2025-03-19 NOTE — H&P PST ADULT - [facility]ANTHTIRED[facility]_ENT_A_[facility]
Refill Routing Note   Medication(s) are not appropriate for processing by [facility] for the following reason(s):        Patient not seen by provider within 15 months    [facility] action(s):  Defer     Requires labs : Yes             Appointments  past 12m or future 3m with PCP    Date Provider   Last Visit   10/16/2023 Mike Morley MD   Next Visit   Visit date not found Mike Morley MD   ED visits in past 90 days: 0        Note composed:6:10 PM 03/19/2025           No

## 2025-09-09 ENCOUNTER — RX RENEWAL (OUTPATIENT)
Age: 75
End: 2025-09-09

## 2025-09-24 LAB
ALBUMIN SERPL ELPH-MCNC: 4.6 G/DL
ALP BLD-CCNC: 71 U/L
ALT SERPL-CCNC: 27 U/L
ANION GAP SERPL CALC-SCNC: 14 MMOL/L
AST SERPL-CCNC: 30 U/L
BILIRUB SERPL-MCNC: 0.4 MG/DL
BUN SERPL-MCNC: 17 MG/DL
CALCIUM SERPL-MCNC: 9.5 MG/DL
CHLORIDE SERPL-SCNC: 105 MMOL/L
CHOLEST SERPL-MCNC: 144 MG/DL
CO2 SERPL-SCNC: 24 MMOL/L
CREAT SERPL-MCNC: 1.02 MG/DL
EGFRCR SERPLBLD CKD-EPI 2021: 58 ML/MIN/1.73M2
ESTIMATED AVERAGE GLUCOSE: 123 MG/DL
GLUCOSE SERPL-MCNC: 101 MG/DL
HBA1C MFR BLD HPLC: 5.9 %
HDLC SERPL-MCNC: 42 MG/DL
LDLC SERPL-MCNC: 72 MG/DL
NONHDLC SERPL-MCNC: 102 MG/DL
POTASSIUM SERPL-SCNC: 4.8 MMOL/L
PROT SERPL-MCNC: 6.8 G/DL
SODIUM SERPL-SCNC: 143 MMOL/L
TRIGL SERPL-MCNC: 178 MG/DL